# Patient Record
Sex: MALE | Race: WHITE | NOT HISPANIC OR LATINO | URBAN - NONMETROPOLITAN AREA
[De-identification: names, ages, dates, MRNs, and addresses within clinical notes are randomized per-mention and may not be internally consistent; named-entity substitution may affect disease eponyms.]

---

## 2017-01-06 ENCOUNTER — OFFICE VISIT (OUTPATIENT)
Dept: UROLOGY | Facility: CLINIC | Age: 42
End: 2017-01-06

## 2017-01-06 VITALS
SYSTOLIC BLOOD PRESSURE: 124 MMHG | HEIGHT: 69 IN | BODY MASS INDEX: 38.3 KG/M2 | WEIGHT: 258.6 LBS | TEMPERATURE: 97.9 F | DIASTOLIC BLOOD PRESSURE: 68 MMHG

## 2017-01-06 DIAGNOSIS — Z30.09 ENCOUNTER FOR VASECTOMY COUNSELING: Primary | ICD-10-CM

## 2017-01-06 PROCEDURE — 99203 OFFICE O/P NEW LOW 30 MIN: CPT | Performed by: UROLOGY

## 2017-01-06 RX ORDER — DIAZEPAM 10 MG/1
10 TABLET ORAL 2 TIMES DAILY PRN
Qty: 1 TABLET | Refills: 0 | Status: SHIPPED | OUTPATIENT
Start: 2017-01-06 | End: 2017-02-21 | Stop reason: ALTCHOICE

## 2017-01-06 RX ORDER — ATORVASTATIN CALCIUM 40 MG/1
TABLET, FILM COATED ORAL
COMMUNITY

## 2017-01-06 RX ORDER — LISINOPRIL 40 MG/1
TABLET ORAL
COMMUNITY

## 2017-01-06 RX ORDER — CETIRIZINE HYDROCHLORIDE 10 MG/1
TABLET ORAL
COMMUNITY

## 2017-01-06 RX ORDER — HYDROCODONE BITARTRATE AND ACETAMINOPHEN 7.5; 325 MG/1; MG/1
1 TABLET ORAL EVERY 6 HOURS PRN
Qty: 24 TABLET | Refills: 0 | Status: SHIPPED | OUTPATIENT
Start: 2017-01-06 | End: 2017-02-21 | Stop reason: ALTCHOICE

## 2017-01-06 NOTE — PROGRESS NOTES
Subjective    Mr. Gallegos is 41 y.o. male    Chief Complaint: Sterilization    History of Present Illness     Vasectomy Consult  Patient here for pre-vasectomy consultation. He denies hematuria, urinary tract infections, urolithiasis, prostatitis, erectile dysfunction, previous genitourinary surgery, epididymal orchitis, testicular masses, scrotal trauma. He was given the consent form, pre-vasectomy instruction sheet, and vasectomy booklet. I extensively reviewed with him the likely postoperative recuperative period as well as the need to continue to use contraception until he is notified by us of his sterility. He will have a semen analysis after 20-30 ejaculations. He understands the potential side effects of local anesthesia, bleeding, scrotal hematoma, wound infection, epididymal orchitis, epididymal congestion,  1% risk chronic testicular pain potentially requiring further surgery, sperm granuloma, antisperm antibodies, early recanalization, spontaneous recanalization with pregnancy after demonstration of azoospermia risk of 1 in 2000 and the possible association with prostate cancer. He is aware of alternatives to vasectomy. He has given this careful consideration and wishes to proceed with a vasectomy.       The following portions of the patient's history were reviewed and updated as appropriate: allergies, current medications, past family history, past medical history, past social history, past surgical history and problem list.    Review of Systems   Constitutional: Negative for appetite change and fever.   HENT: Negative for hearing loss and sore throat.    Eyes: Negative for pain and redness.   Respiratory: Negative for cough and shortness of breath.    Cardiovascular: Negative for chest pain and leg swelling.   Gastrointestinal: Negative for anal bleeding, nausea and vomiting.   Endocrine: Negative for cold intolerance and heat intolerance.   Genitourinary: Negative for dysuria, flank pain and  "hematuria.   Musculoskeletal: Negative for joint swelling and myalgias.   Skin: Negative for color change and rash.   Allergic/Immunologic: Negative for immunocompromised state.   Neurological: Negative for dizziness and speech difficulty.   Hematological: Negative for adenopathy. Does not bruise/bleed easily.   Psychiatric/Behavioral: Negative for dysphoric mood and suicidal ideas.         Current Outpatient Prescriptions:   •  atorvastatin (LIPITOR) 40 MG tablet, Take 40 mg by mouth daily, Disp: , Rfl:   •  cetirizine (zyrTEC) 10 MG tablet, Take 10 mg by mouth daily, Disp: , Rfl:   •  lisinopril (PRINIVIL,ZESTRIL) 40 MG tablet, Take 40 mg by mouth daily, Disp: , Rfl:   •  diazePAM (VALIUM) 10 MG tablet, Take 1 tablet by mouth 2 (Two) Times a Day As Needed for anxiety. Take prior to procedure, Disp: 1 tablet, Rfl: 0  •  HYDROcodone-acetaminophen (NORCO) 7.5-325 MG per tablet, Take 1 tablet by mouth Every 6 (Six) Hours As Needed for moderate pain (4-6)., Disp: 24 tablet, Rfl: 0    Past Medical History   Diagnosis Date   • Hyperlipidemia    • Hypertension        History reviewed. No pertinent past surgical history.    Social History     Social History   • Marital status:      Spouse name: N/A   • Number of children: N/A   • Years of education: N/A     Social History Main Topics   • Smoking status: Never Smoker   • Smokeless tobacco: None   • Alcohol use No   • Drug use: None   • Sexual activity: Not Asked     Other Topics Concern   • None     Social History Narrative   • None       Family History   Problem Relation Age of Onset   • No Known Problems Father    • No Known Problems Mother        Objective    Visit Vitals   • /68   • Temp 97.9 °F (36.6 °C)   • Ht 69\" (175.3 cm)   • Wt 258 lb 9.6 oz (117 kg)   • BMI 38.19 kg/m2       Physical Exam   Constitutional: He is oriented to person, place, and time. He appears well-developed and well-nourished. No distress.   HENT:   Head: Normocephalic and " atraumatic.   Right Ear: External ear and ear canal normal.   Left Ear: External ear and ear canal normal.   Nose: No nasal deformity. No epistaxis.   Mouth/Throat: Oropharynx is clear and moist. Mucous membranes are not pale, not dry and not cyanotic. Normal dentition. No oropharyngeal exudate.   Neck: Trachea normal. No tracheal tenderness present. No tracheal deviation present. No thyroid mass and no thyromegaly present.   Pulmonary/Chest: Effort normal. No accessory muscle usage. No respiratory distress. Chest wall is not dull to percussion (No flatness or hyperresonance). He exhibits no mass and no tenderness.   On palpation, no tactile fremitus. All movements are symmetric. No intercostal retraction noted.    Abdominal: Soft. Normal appearance. He exhibits no distension and no mass. There is no hepatosplenomegaly. There is no tenderness. No hernia.   Rectal examination or stool specimen is not indicated.    Genitourinary:   Genitourinary Comments: Vas palpably normal     Musculoskeletal:   Normal gait and station. The spine, ribs, and pelvis are examined. No obvious misalignment or asymmetry. ROM is reasonable for age. No instability. No obvious atrophy, flaccidity or spasticity.    Lymphadenopathy:     He has no cervical adenopathy.        Right: No inguinal adenopathy present.        Left: No inguinal adenopathy present.   Neurological: He is alert and oriented to person, place, and time.   Skin: Skin is warm, dry and intact. No lesion and no rash noted. He is not diaphoretic. No cyanosis. No pallor. Nails show no clubbing.   On palpation, there were no induration, subcutaneous nodules, or tightening   Psychiatric: His speech is normal and behavior is normal. Judgment and thought content normal. His mood appears not anxious. His affect is not labile. He does not exhibit a depressed mood.   Vitals reviewed.          No results found for this or any previous visit.  Assessment and Plan    Jaguar was seen  today for sterilization.    Diagnoses and all orders for this visit:    Encounter for vasectomy counseling  -     diazePAM (VALIUM) 10 MG tablet; Take 1 tablet by mouth 2 (Two) Times a Day As Needed for anxiety. Take prior to procedure  -     HYDROcodone-acetaminophen (NORCO) 7.5-325 MG per tablet; Take 1 tablet by mouth Every 6 (Six) Hours As Needed for moderate pain (4-6).  -     Vasectomy; Future

## 2017-01-06 NOTE — MR AVS SNAPSHOT
Jaguar Gallegos   1/6/2017 8:20 AM   Office Visit    Dept Phone:  [phone]   Encounter #:  [id number]    Provider:  Jaguar Mota MD   Department:  [facility] UROLOGY                Your Full Care Plan              Today's Medication Changes          These changes are accurate as of: 1/6/17  9:02 AM.  If you have any questions, ask your nurse or doctor.               New Medication(s)Ordered:     diazePAM 10 MG tablet   Commonly known as:  VALIUM   Take 1 tablet by mouth 2 (Two) Times a Day As Needed for anxiety. Take prior to procedure   Started by:  Jaguar Mota MD       HYDROcodone-acetaminophen 7.5-325 MG per tablet   Commonly known as:  NORCO   Take 1 tablet by mouth Every 6 (Six) Hours As Needed for moderate pain (4-6).   Started by:  Jaguar Mota MD            Where to Get Your Medications      You can get these medications from any pharmacy     Bring a paper prescription for each of these medications     diazePAM 10 MG tablet    HYDROcodone-acetaminophen 7.5-325 MG per tablet                  Your Updated Medication List          This list is accurate as of: 1/6/17  9:02 AM.  Always use your most recent med list.                atorvastatin 40 MG tablet   Commonly known as:  LIPITOR       cetirizine 10 MG tablet   Commonly known as:  zyrTEC       diazePAM 10 MG tablet   Commonly known as:  VALIUM   Take 1 tablet by mouth 2 (Two) Times a Day As Needed for anxiety. Take prior to procedure       HYDROcodone-acetaminophen 7.5-325 MG per tablet   Commonly known as:  NORCO   Take 1 tablet by mouth Every 6 (Six) Hours As Needed for moderate pain (4-6).       lisinopril 40 MG tablet   Commonly known as:  PRINIVIL,ZESTRIL               You Were Diagnosed With        Codes Comments    Encounter for vasectomy counseling    -  Primary ICD-10-CM: Z30.09  ICD-9-CM: V25.09       Instructions     None    Patient Instructions History      Upcoming  "Appointments     Visit Type Date Time Department    NEW PATIENT 2017  8:20 AM [facility]    NEW PATIENT 2017  1:30 PM [facility]    VASECTOMY 3/16/2017  9:30 AM [facility]      MyChart Signup     Our records indicate that you have declined [facility]. If you would like to sign up for MyChart, please email [email or web address] or call [phone] to obtain an activation code.             Other Info from Your Visit           Your Appointments     2017  1:30 PM CST   New Patient with ARNULFO Catherine   [facility] (--)    [address]   [address]  [facility] [address] [phone]           Bring all previous medical records and films, along with current medications and insurance information.            Mar 16, 2017  9:30 AM CDT   VASECTOMY with Jaguar Mota MD   [facility] UROLOGY (--)    [address]  [facility] [address] [phone]              Allergies     Measles, Mumps & Rubella Vac      Tetanus Toxoids      INCLUDES DTP ALLERGY      Reason for Visit     Sterilization           Vital Signs     Blood Pressure Temperature Height Weight Body Mass Index Smoking Status    124/68 97.9 °F (36.6 °C) 69\" (175.3 cm) 258 lb 9.6 oz (117 kg) 38.19 kg/m2 Never Smoker      Problems and Diagnoses Noted     Family planning    -  Primary        "

## 2017-02-21 ENCOUNTER — OFFICE VISIT (OUTPATIENT)
Dept: OTOLARYNGOLOGY | Facility: CLINIC | Age: 42
End: 2017-02-21

## 2017-02-21 VITALS
HEIGHT: 69 IN | SYSTOLIC BLOOD PRESSURE: 154 MMHG | TEMPERATURE: 98.7 F | BODY MASS INDEX: 38.88 KG/M2 | WEIGHT: 262.5 LBS | HEART RATE: 90 BPM | DIASTOLIC BLOOD PRESSURE: 85 MMHG

## 2017-02-21 DIAGNOSIS — R06.83 SNORING: ICD-10-CM

## 2017-02-21 DIAGNOSIS — I10 ESSENTIAL HYPERTENSION: ICD-10-CM

## 2017-02-21 DIAGNOSIS — R53.83 OTHER FATIGUE: ICD-10-CM

## 2017-02-21 DIAGNOSIS — J34.3 HYPERTROPHY OF INFERIOR NASAL TURBINATE: ICD-10-CM

## 2017-02-21 DIAGNOSIS — E66.3 OVERWEIGHT: ICD-10-CM

## 2017-02-21 DIAGNOSIS — G47.33 OSA (OBSTRUCTIVE SLEEP APNEA): ICD-10-CM

## 2017-02-21 DIAGNOSIS — J34.2 DEVIATED NASAL SEPTUM: Primary | ICD-10-CM

## 2017-02-21 PROCEDURE — 99203 OFFICE O/P NEW LOW 30 MIN: CPT | Performed by: NURSE PRACTITIONER

## 2017-02-21 RX ORDER — M-VIT,TX,IRON,MINS/CALC/FOLIC 27MG-0.4MG
TABLET ORAL
COMMUNITY

## 2017-02-21 RX ORDER — FLUTICASONE PROPIONATE 50 MCG
SPRAY, SUSPENSION (ML) NASAL
Refills: 1 | COMMUNITY
Start: 2017-01-03

## 2017-02-21 RX ORDER — FLUTICASONE PROPIONATE 50 MCG
2 SPRAY, SUSPENSION (ML) NASAL DAILY
Qty: 16 G | Refills: 11 | Status: SHIPPED | OUTPATIENT
Start: 2017-02-21 | End: 2017-03-23

## 2017-02-21 RX ORDER — OLOPATADINE HYDROCHLORIDE 665 UG/1
2 SPRAY NASAL 2 TIMES DAILY
Qty: 1 BOTTLE | Refills: 5 | Status: ON HOLD | OUTPATIENT
Start: 2017-02-21 | End: 2019-12-16

## 2017-02-21 NOTE — PROGRESS NOTES
"YOB: 1975  Location: [facility]  Location Address: [address] [place] [address], [facility], [address] [place] [address]  Location Phone: [phone]    Chief Complaint   Patient presents with   • Snoring       History of Present Illness  Jaguar Gallegos is a 42 y.o. male.  Jaguar Gallegos is here for evaluation of ENT complaints. The patient has had problems with snoring  The symptoms are not localized to a particular location. The patient has had severe symptoms. The symptoms have been present for the last several years . The symptoms are aggravated by  no identifiable factors . The symptoms are improved by Bipap.  He has been on BIPAP for several years with decreased toleration of the machine.  He continues to snore and have fatigue.  He is positive for nasal congestion.  He is positive for daytime fatigue.  He is on BP meds.  Neck circumference 17.5\".  [place] scale 5       Past Medical History   Diagnosis Date   • Hyperlipidemia    • Hypertension    • Kidney stones    • Sleep apnea        History reviewed. No pertinent past surgical history.      Current Outpatient Prescriptions:   •  atorvastatin (LIPITOR) 40 MG tablet, Take 40 mg by mouth daily, Disp: , Rfl:   •  cetirizine (zyrTEC) 10 MG tablet, Take 10 mg by mouth daily, Disp: , Rfl:   •  fluticasone (FLONASE) 50 MCG/ACT nasal spray, INSTILL 2 PUFFS INTO EACH NOSTRIL ONCE DAILY, Disp: , Rfl: 1  •  lisinopril (PRINIVIL,ZESTRIL) 40 MG tablet, Take 40 mg by mouth daily, Disp: , Rfl:   •  therapeutic multivitamin-minerals (THERAGRAN-M) tablet, Take 1 tablet by mouth daily, Disp: , Rfl:   •  fluticasone (FLONASE) 50 MCG/ACT nasal spray, 2 sprays into each nostril Daily for 30 days., Disp: 16 g, Rfl: 11  •  olopatadine (PATANASE) 0.6 % solution nasal solution, 2 sprays by Each Nare route 2 (Two) Times a Day., Disp: 1 bottle, Rfl: 5    Measles, mumps & rubella vac and Tetanus toxoids    Family History   Problem Relation Age of Onset   • No Known Problems " Father    • No Known Problems Mother        Social History     Social History   • Marital status:      Spouse name: N/A   • Number of children: N/A   • Years of education: N/A     Occupational History   • Not on file.     Social History Main Topics   • Smoking status: Never Smoker   • Smokeless tobacco: Not on file   • Alcohol use No   • Drug use: Defer   • Sexual activity: Not on file     Other Topics Concern   • Not on file     Social History Narrative       Review of Systems   Constitutional: Negative.    HENT:        SEE HPI   Eyes: Negative.    Respiratory: Negative.    Cardiovascular: Negative.    Gastrointestinal: Negative.    Endocrine: Negative.    Genitourinary: Negative.    Musculoskeletal: Negative.    Skin: Negative.    Allergic/Immunologic: Negative.    Neurological: Negative.    Hematological: Negative.    Psychiatric/Behavioral: Negative.        Vitals:    02/21/17 1329   BP: 154/85   Pulse: 90   Temp: 98.7 °F (37.1 °C)       Objective     Physical Exam  CONSTITUTIONAL: well nourished, overweight, alert, oriented, in no acute distress     COMMUNICATION AND VOICE: able to communicate normally, normal voice quality    HEAD: normocephalic, no lesions, atraumatic, no tenderness, no masses     FACE: appearance normal, no lesions, no tenderness, no deformities, facial motion symmetric    SALIVARY GLANDS: parotid glands with no tenderness, no swelling, no masses, submandibular glands with normal size, nontender    EYES: ocular motility normal, eyelids normal, orbits normal, no proptosis, conjunctiva normal , pupils equal, round     EARS:  Hearing: response to conversational voice normal bilaterally   External Ears: auricles without lesions  Otoscopic: tympanic membrane appearance normal, no lesions, no perforation, normal mobility, no fluid    NOSE:  External Nose: structure normal, no tenderness on palpation, no nasal discharge, no lesions, no evidence of trauma, nostrils patent   Intranasal Exam:  right septal deviation partially obstructive with hypertrophy of inferior turbinates.      ORAL:  Lips: upper and lower lips without lesion   Teeth: dentition within normal limits for age   Gums: gingivae healthy   Oral Mucosa: oral mucosa normal, no mucosal lesions   Floor of Mouth: Warthin’s duct patent, mucosa normal  Tongue: lingual mucosa normal without lesions, normal tongue mobility   Palate: soft and hard palates with normal mucosa and structure  Oropharynx: low palate with enlarged tongue, low positioned uvula with Malampati scale 4    NECK: neck appearance normal, no mass,  noted without erythema or tenderness    THYROID: no overt thyromegaly, no tenderness, nodules or mass present on palpation, position midline     LYMPH NODES: no lymphadenopathy    CHEST/RESPIRATORY: respiratory effort normal    CARDIOVASCULAR: extremities without cyanosis or edema      NEUROLOGIC/PSYCHIATRIC: oriented to time, place and person, mood normal, affect appropriate, CN II-XII intact grossly    Assessment/Plan   Jaguar was seen today for snoring.    Diagnoses and all orders for this visit:    Deviated nasal septum  -     CT Sinus Without Contrast; Future    Snoring  -     CT Sinus Without Contrast; Future    Overweight    ABHISHEK (obstructive sleep apnea)    Hypertrophy of inferior nasal turbinate    Other fatigue    Essential hypertension    Other orders  -     fluticasone (FLONASE) 50 MCG/ACT nasal spray; 2 sprays into each nostril Daily for 30 days.  -     olopatadine (PATANASE) 0.6 % solution nasal solution; 2 sprays by Each Nare route 2 (Two) Times a Day.        Orders Placed This Encounter   Procedures   • CT Sinus Without Contrast     Standing Status:   Future     Standing Expiration Date:   2/21/2018     Order Specific Question:   Reason for Exam:     Answer:   septal deviation, snoring     Return in about 6 weeks (around 4/4/2017).       Patient Instructions   Obtain sleep study.  Add nasal sprays.  Followup with CT  SINUSES.  Call for problems or worsening symptoms    Medical vs surgical options discussed

## 2017-02-21 NOTE — PATIENT INSTRUCTIONS
Obtain sleep study.  Add nasal sprays.  Followup with CT SINUSES.  Call for problems or worsening symptoms    Medical vs surgical options discussed

## 2017-03-15 ENCOUNTER — TELEPHONE (OUTPATIENT)
Dept: UROLOGY | Facility: CLINIC | Age: 42
End: 2017-03-15

## 2017-03-16 ENCOUNTER — OFFICE VISIT (OUTPATIENT)
Dept: UROLOGY | Facility: CLINIC | Age: 42
End: 2017-03-16

## 2017-03-16 DIAGNOSIS — Z30.2 ENCOUNTER FOR VASECTOMY: Primary | ICD-10-CM

## 2017-03-16 PROCEDURE — 55250 REMOVAL OF SPERM DUCT(S): CPT | Performed by: UROLOGY

## 2017-03-16 NOTE — PROGRESS NOTES
No Scalpel Vasectomy Procedure Note    Indications: 42 y.o. male desiring permanent sterilization    Pre-operative Diagnosis: Undesired fertility    Post-operative Diagnosis: Undesired fertility    Anesthesia: Lidocaine 1% without epinephrine without added sodium bicarbonate    Procedure Details     The risks and benefits of the procedure were discussed at the pre-procedure consultation, and written, informed consent obtained.    Premedicated with Norco 7.5 and Valium 10 mg 30 minutes prior to procedure.    The scrotum was palpated with both testes normal in size and position, no masses palpated. The scrotum was cleansed with warm Betadine and draped in the usual sterile manner.     A vasal sheath block was performed on both the left and right vas.  After adequate anesthesia was established, a small perforation was made in the skin and the right vas was isolated with the ring forceps, dissected free and delivered through the skin perforation.  The right vas was divided, approximately 3 cm portion removed, and each end of the vas was cauterized.  The ends of the vas were replaced in the scrotum through the puncture site.  The left vas was then isolated, divided, cauterized in a similar fashion.  Midportions removed not sent to pathology to confirm because they were grossly normal.     Any bleeding was controlled with electrocautery.  3.0 Chormic interrupted suture was used to close both sites. The puncture site was dry when the procedure was completed. Dressing was applied to both incisions and jock strap placed for scrotal support.    Specimen: None    Condition: Stable    Complications: None    Plan:  1. Continue contraception until negative sperm analysis. Bring 2 semen samples after 20-30 ejaculates  2. Warning signs of infection were reviewed.   3. Patient is taken home by significant other with written home care instructions.  Bedrest X 48 hrs, Ice pack every 3 hours for 24 hrs.    Call the clinic if excessive  pain, bleeding or swelling.

## 2017-04-10 ENCOUNTER — HOSPITAL ENCOUNTER (INPATIENT)
Facility: HOSPITAL | Age: 42
LOS: 2 days | Discharge: HOME | End: 2017-04-12
Attending: INTERNAL MEDICINE | Admitting: INTERNAL MEDICINE

## 2017-04-10 DIAGNOSIS — T63.301A: ICD-10-CM

## 2017-04-10 PROBLEM — L02.419 ABSCESS OF THIGH: Status: ACTIVE | Noted: 2017-04-10

## 2017-04-10 PROBLEM — H92.09 EAR ACHE: Status: ACTIVE | Noted: 2017-04-10

## 2017-04-10 PROBLEM — E78.5 HYPERLIPIDEMIA: Status: ACTIVE | Noted: 2017-04-10

## 2017-04-10 PROBLEM — L02.419 THIGH ABSCESS: Status: ACTIVE | Noted: 2017-04-10

## 2017-04-10 PROBLEM — I10 ESSENTIAL HYPERTENSION: Status: ACTIVE | Noted: 2017-04-10

## 2017-04-10 LAB
ALBUMIN SERPL-MCNC: 4.2 G/DL (ref 3.5–5)
ALBUMIN/GLOB SERPL: 1.4 G/DL (ref 1.1–2.5)
ALP SERPL-CCNC: 112 U/L (ref 24–120)
ALT SERPL W P-5'-P-CCNC: 35 U/L (ref 0–54)
ANION GAP SERPL CALCULATED.3IONS-SCNC: 17 MMOL/L (ref 4–13)
AST SERPL-CCNC: 29 U/L (ref 7–45)
BASOPHILS # BLD AUTO: 0.02 10*3/MM3 (ref 0–0.2)
BASOPHILS NFR BLD AUTO: 0.2 % (ref 0–2)
BILIRUB SERPL-MCNC: 0.6 MG/DL (ref 0.1–1)
BUN BLD-MCNC: 14 MG/DL (ref 5–21)
BUN/CREAT SERPL: 14 (ref 7–25)
CALCIUM SPEC-SCNC: 9.1 MG/DL (ref 8.4–10.4)
CHLORIDE SERPL-SCNC: 102 MMOL/L (ref 98–110)
CK SERPL-CCNC: 131 U/L (ref 0–203)
CO2 SERPL-SCNC: 25 MMOL/L (ref 24–31)
CREAT BLD-MCNC: 1 MG/DL (ref 0.5–1.4)
DEPRECATED RDW RBC AUTO: 42.6 FL (ref 40–54)
EOSINOPHIL # BLD AUTO: 0.2 10*3/MM3 (ref 0–0.7)
EOSINOPHIL NFR BLD AUTO: 1.7 % (ref 0–4)
ERYTHROCYTE [DISTWIDTH] IN BLOOD BY AUTOMATED COUNT: 14.2 % (ref 12–15)
GFR SERPL CREATININE-BSD FRML MDRD: 82 ML/MIN/1.73
GLOBULIN UR ELPH-MCNC: 3.1 GM/DL
GLUCOSE BLD-MCNC: 96 MG/DL (ref 70–100)
HCT VFR BLD AUTO: 37.2 % (ref 40–52)
HGB BLD-MCNC: 12.3 G/DL (ref 14–18)
IMM GRANULOCYTES # BLD: 0.02 10*3/MM3 (ref 0–0.03)
IMM GRANULOCYTES NFR BLD: 0.2 % (ref 0–5)
LYMPHOCYTES # BLD AUTO: 1.44 10*3/MM3 (ref 0.72–4.86)
LYMPHOCYTES NFR BLD AUTO: 12 % (ref 15–45)
MCH RBC QN AUTO: 27 PG (ref 28–32)
MCHC RBC AUTO-ENTMCNC: 33.1 G/DL (ref 33–36)
MCV RBC AUTO: 81.6 FL (ref 82–95)
MONOCYTES # BLD AUTO: 0.77 10*3/MM3 (ref 0.19–1.3)
MONOCYTES NFR BLD AUTO: 6.4 % (ref 4–12)
NEUTROPHILS # BLD AUTO: 9.57 10*3/MM3 (ref 1.87–8.4)
NEUTROPHILS NFR BLD AUTO: 79.5 % (ref 39–78)
PLATELET # BLD AUTO: 298 10*3/MM3 (ref 130–400)
PMV BLD AUTO: 9.6 FL (ref 6–12)
POTASSIUM BLD-SCNC: 4 MMOL/L (ref 3.5–5.3)
PROT SERPL-MCNC: 7.3 G/DL (ref 6.3–8.7)
RBC # BLD AUTO: 4.56 10*6/MM3 (ref 4.8–5.9)
SODIUM BLD-SCNC: 144 MMOL/L (ref 135–145)
WBC NRBC COR # BLD: 12.02 10*3/MM3 (ref 4.8–10.8)

## 2017-04-10 PROCEDURE — 87070 CULTURE OTHR SPECIMN AEROBIC: CPT | Performed by: INTERNAL MEDICINE

## 2017-04-10 PROCEDURE — 25010000002 ENOXAPARIN PER 10 MG: Performed by: INTERNAL MEDICINE

## 2017-04-10 PROCEDURE — 25010000002 MORPHINE SULFATE (PF) 2 MG/ML SOLUTION: Performed by: INTERNAL MEDICINE

## 2017-04-10 PROCEDURE — 87147 CULTURE TYPE IMMUNOLOGIC: CPT | Performed by: INTERNAL MEDICINE

## 2017-04-10 PROCEDURE — 25010000002 KETOROLAC TROMETHAMINE PER 15 MG: Performed by: INTERNAL MEDICINE

## 2017-04-10 PROCEDURE — 87186 SC STD MICRODIL/AGAR DIL: CPT | Performed by: INTERNAL MEDICINE

## 2017-04-10 PROCEDURE — 87205 SMEAR GRAM STAIN: CPT | Performed by: INTERNAL MEDICINE

## 2017-04-10 PROCEDURE — 80053 COMPREHEN METABOLIC PANEL: CPT | Performed by: INTERNAL MEDICINE

## 2017-04-10 PROCEDURE — 25010000002 VANCOMYCIN PER 500 MG: Performed by: INTERNAL MEDICINE

## 2017-04-10 PROCEDURE — 82550 ASSAY OF CK (CPK): CPT | Performed by: INTERNAL MEDICINE

## 2017-04-10 PROCEDURE — 87185 SC STD ENZYME DETCJ PER NZM: CPT | Performed by: INTERNAL MEDICINE

## 2017-04-10 PROCEDURE — 85025 COMPLETE CBC W/AUTO DIFF WBC: CPT | Performed by: INTERNAL MEDICINE

## 2017-04-10 PROCEDURE — 87040 BLOOD CULTURE FOR BACTERIA: CPT | Performed by: INTERNAL MEDICINE

## 2017-04-10 RX ORDER — NALOXONE HCL 0.4 MG/ML
0.4 VIAL (ML) INJECTION
Status: DISCONTINUED | OUTPATIENT
Start: 2017-04-10 | End: 2017-04-12 | Stop reason: HOSPADM

## 2017-04-10 RX ORDER — ATORVASTATIN CALCIUM 40 MG/1
40 TABLET, FILM COATED ORAL NIGHTLY
Status: DISCONTINUED | OUTPATIENT
Start: 2017-04-10 | End: 2017-04-12 | Stop reason: HOSPADM

## 2017-04-10 RX ORDER — VANCOMYCIN HYDROCHLORIDE 1 G/200ML
1000 INJECTION, SOLUTION INTRAVENOUS EVERY 8 HOURS
Status: DISCONTINUED | OUTPATIENT
Start: 2017-04-10 | End: 2017-04-12 | Stop reason: DRUGHIGH

## 2017-04-10 RX ORDER — ONDANSETRON 2 MG/ML
4 INJECTION INTRAMUSCULAR; INTRAVENOUS EVERY 6 HOURS PRN
Status: DISCONTINUED | OUTPATIENT
Start: 2017-04-10 | End: 2017-04-12 | Stop reason: HOSPADM

## 2017-04-10 RX ORDER — MORPHINE SULFATE 2 MG/ML
1 INJECTION, SOLUTION INTRAMUSCULAR; INTRAVENOUS EVERY 4 HOURS PRN
Status: DISCONTINUED | OUTPATIENT
Start: 2017-04-10 | End: 2017-04-12 | Stop reason: HOSPADM

## 2017-04-10 RX ORDER — LISINOPRIL 20 MG/1
40 TABLET ORAL
Status: DISCONTINUED | OUTPATIENT
Start: 2017-04-10 | End: 2017-04-12 | Stop reason: HOSPADM

## 2017-04-10 RX ORDER — KETOROLAC TROMETHAMINE 30 MG/ML
30 INJECTION, SOLUTION INTRAMUSCULAR; INTRAVENOUS EVERY 6 HOURS PRN
Status: DISPENSED | OUTPATIENT
Start: 2017-04-10 | End: 2017-04-11

## 2017-04-10 RX ORDER — SODIUM CHLORIDE 0.9 % (FLUSH) 0.9 %
1-10 SYRINGE (ML) INJECTION AS NEEDED
Status: DISCONTINUED | OUTPATIENT
Start: 2017-04-10 | End: 2017-04-12 | Stop reason: HOSPADM

## 2017-04-10 RX ORDER — CETIRIZINE HYDROCHLORIDE 10 MG/1
10 TABLET ORAL DAILY
Status: DISCONTINUED | OUTPATIENT
Start: 2017-04-10 | End: 2017-04-12 | Stop reason: HOSPADM

## 2017-04-10 RX ORDER — SODIUM CHLORIDE 9 MG/ML
100 INJECTION, SOLUTION INTRAVENOUS CONTINUOUS
Status: DISCONTINUED | OUTPATIENT
Start: 2017-04-10 | End: 2017-04-12 | Stop reason: HOSPADM

## 2017-04-10 RX ADMIN — MORPHINE SULFATE 1 MG: 2 INJECTION, SOLUTION INTRAMUSCULAR; INTRAVENOUS at 22:06

## 2017-04-10 RX ADMIN — VANCOMYCIN HYDROCHLORIDE 1000 MG: 1 INJECTION, SOLUTION INTRAVENOUS at 22:16

## 2017-04-10 RX ADMIN — KETOROLAC TROMETHAMINE 30 MG: 30 INJECTION, SOLUTION INTRAMUSCULAR at 22:06

## 2017-04-10 RX ADMIN — COLLAGENASE SANTYL: 250 OINTMENT TOPICAL at 22:06

## 2017-04-10 RX ADMIN — SODIUM CHLORIDE 100 ML/HR: 9 INJECTION, SOLUTION INTRAVENOUS at 22:08

## 2017-04-10 RX ADMIN — ENOXAPARIN SODIUM 40 MG: 40 INJECTION SUBCUTANEOUS at 20:17

## 2017-04-10 NOTE — H&P
"    [facility]  HISTORY AND PHYSICAL    Date of Admission: 4/10/2017  Primary Care Physician: John Recio MD    Subjective     Chief Complaint: sent as direct admit by [facility] - felt will require IV abxs for soft tissue infection    History of Present Illness  Past Saturday -  Ear hurting  Right side - went to clinic  --- TMJ  disease  Last week still continue to hurt;   The prior Thursday, went to see a dentist for cleaning and X-ray - everything was normal  This past Thursday, he went to [facility] -- \"ear canal and drum infection\"; he also showed \"spots\" in his legs/lower extremities  She prescribed Cipro and \"eardrop\"  With regards the spot, it was assumed that Cipro would cover the ear and \"spots\"  The spot spread and been burning.  Questionable spider bite on right leg  Friction rub from keys in his pockets climbing ladder (part of job - )      Review of Systems   Felt feverish, chills  No nausea, vomiting  + aching lower extremities  no  Urinary or bowel disturbance  Uncomfortable in sitting  Otherwise complete ROS reviewed and negative except as mentioned in the HPI.      Past Medical History:   Past Medical History:   Diagnosis Date   • Deviated nasal septum    • Hyperlipidemia    • Hypertension    • Hypertrophy of inferior nasal turbinate    • Kidney stones    • Obstructive sleep apnea    • Sleep apnea    • Snoring        Past Surgical History: Vasectomy - March 16, 2017    Social History:  reports that he has never smoked. He does not have any smokeless tobacco history on file. Drug use questions deferred to the physician. He reports that he does not drink alcohol.     Family History: Hypertension, hyperlipidemia and    Heart disease    Allergies:  Allergies   Allergen Reactions   • Measles, Mumps & Rubella Vac    • Tetanus Toxoids      INCLUDES DTP ALLERGY  INCLUDES DTP ALLERGY       Medications:  Prior to " Admission medications    Medication Sig Start Date End Date Taking? Authorizing Provider   atorvastatin (LIPITOR) 40 MG tablet Take 40 mg by mouth daily    Historical Provider, MD   cetirizine (zyrTEC) 10 MG tablet Take 10 mg by mouth daily    Historical Provider, MD   fluticasone (FLONASE) 50 MCG/ACT nasal spray INSTILL 2 PUFFS INTO EACH NOSTRIL ONCE DAILY 1/3/17   Historical Provider, MD   lisinopril (PRINIVIL,ZESTRIL) 40 MG tablet Take 40 mg by mouth daily    Historical Provider, MD   olopatadine (PATANASE) 0.6 % solution nasal solution 2 sprays by Each Nare route 2 (Two) Times a Day. 2/21/17   ARNULFO Catherine   therapeutic multivitamin-minerals (THERAGRAN-M) tablet Take 1 tablet by mouth daily    Historical Provider, MD       Objective     Vital Signs: There were no vitals taken for this visit.  Physical Exam   Constitutional: He is oriented to person, place, and time. He appears well-developed and well-nourished.   HENT:   Head: Normocephalic and atraumatic.   Right Ear: External ear normal.   Left Ear: External ear normal.   Nose: Nose normal.   Mouth/Throat: Oropharynx is clear and moist. No oropharyngeal exudate.   Eyes: Conjunctivae and EOM are normal. Pupils are equal, round, and reactive to light. No scleral icterus.   Neck: Normal range of motion. Neck supple. No JVD present. No tracheal deviation present. No thyromegaly present.   Cardiovascular: Normal rate, regular rhythm and normal heart sounds.    Pulmonary/Chest: Effort normal and breath sounds normal.   Abdominal: Soft. Bowel sounds are normal. He exhibits no distension. There is no tenderness.   Musculoskeletal: Normal range of motion. He exhibits edema.   Lymphadenopathy:     He has no cervical adenopathy.   Neurological: He is alert and oriented to person, place, and time.   Skin: Skin is warm and dry. No erythema.        Psychiatric: He has a normal mood and affect. His behavior is normal. Judgment and thought content normal.            Results Reviewed:  Lab Results (last 24 hours)     ** No results found for the last 24 hours. **        Imaging Results (last 24 hours)     ** No results found for the last 24 hours. **          I have personally reviewed and interpreted the radiology studies and ECG obtained at time of admission.     Assessment / Plan     Assessment & Plan    Abscesses bilateral thigh and left leg  Limb edema sec. To above  Pain sec. To above  Right ear otalgia  Hypertension  Hyperlipidemia  Sleep apnea - cpap      Vancomycin; pharmacy to dose  Wound and blood culture  Resume home medications except cipro  Other plans per orders  Warm compress  Collagenase on right leg wound  Labs  Discussed with Rosita      Code Status: full     I discussed the patients findings and my recommendations with  patinet and wife  Estimated length of stay 3-4 days    Raffaele Obrien MD   04/10/17   5:23 PM

## 2017-04-11 ENCOUNTER — ANESTHESIA EVENT (OUTPATIENT)
Dept: PERIOP | Facility: HOSPITAL | Age: 42
End: 2017-04-11

## 2017-04-11 ENCOUNTER — ANESTHESIA (OUTPATIENT)
Dept: PERIOP | Facility: HOSPITAL | Age: 42
End: 2017-04-11

## 2017-04-11 PROCEDURE — 87206 SMEAR FLUORESCENT/ACID STAI: CPT | Performed by: SPECIALIST

## 2017-04-11 PROCEDURE — 25010000002 ONDANSETRON PER 1 MG: Performed by: NURSE ANESTHETIST, CERTIFIED REGISTERED

## 2017-04-11 PROCEDURE — 25010000002 KETOROLAC TROMETHAMINE PER 15 MG: Performed by: INTERNAL MEDICINE

## 2017-04-11 PROCEDURE — 87185 SC STD ENZYME DETCJ PER NZM: CPT | Performed by: INTERNAL MEDICINE

## 2017-04-11 PROCEDURE — 25010000002 MIDAZOLAM PER 1 MG: Performed by: ANESTHESIOLOGY

## 2017-04-11 PROCEDURE — 25010000002 VANCOMYCIN PER 500 MG: Performed by: INTERNAL MEDICINE

## 2017-04-11 PROCEDURE — 87205 SMEAR GRAM STAIN: CPT | Performed by: INTERNAL MEDICINE

## 2017-04-11 PROCEDURE — 87186 SC STD MICRODIL/AGAR DIL: CPT | Performed by: INTERNAL MEDICINE

## 2017-04-11 PROCEDURE — 25010000002 DEXAMETHASONE PER 1 MG: Performed by: NURSE ANESTHETIST, CERTIFIED REGISTERED

## 2017-04-11 PROCEDURE — 0J9N0ZZ DRAINAGE OF RIGHT LOWER LEG SUBCUTANEOUS TISSUE AND FASCIA, OPEN APPROACH: ICD-10-PCS | Performed by: SPECIALIST

## 2017-04-11 PROCEDURE — 25010000002 FENTANYL CITRATE (PF) 250 MCG/5ML SOLUTION: Performed by: NURSE ANESTHETIST, CERTIFIED REGISTERED

## 2017-04-11 PROCEDURE — 25010000002 METOCLOPRAMIDE PER 10 MG: Performed by: ANESTHESIOLOGY

## 2017-04-11 PROCEDURE — 87116 MYCOBACTERIA CULTURE: CPT | Performed by: SPECIALIST

## 2017-04-11 PROCEDURE — 87075 CULTR BACTERIA EXCEPT BLOOD: CPT | Performed by: SPECIALIST

## 2017-04-11 PROCEDURE — 87147 CULTURE TYPE IMMUNOLOGIC: CPT | Performed by: INTERNAL MEDICINE

## 2017-04-11 PROCEDURE — 87070 CULTURE OTHR SPECIMN AEROBIC: CPT | Performed by: INTERNAL MEDICINE

## 2017-04-11 PROCEDURE — 25010000002 MORPHINE SULFATE (PF) 2 MG/ML SOLUTION: Performed by: INTERNAL MEDICINE

## 2017-04-11 PROCEDURE — 87176 TISSUE HOMOGENIZATION CULTR: CPT | Performed by: INTERNAL MEDICINE

## 2017-04-11 PROCEDURE — 25010000002 PROPOFOL 10 MG/ML EMULSION: Performed by: NURSE ANESTHETIST, CERTIFIED REGISTERED

## 2017-04-11 RX ORDER — SODIUM CHLORIDE 0.9 % (FLUSH) 0.9 %
1-10 SYRINGE (ML) INJECTION AS NEEDED
Status: DISCONTINUED | OUTPATIENT
Start: 2017-04-11 | End: 2017-04-11 | Stop reason: HOSPADM

## 2017-04-11 RX ORDER — IPRATROPIUM BROMIDE AND ALBUTEROL SULFATE 2.5; .5 MG/3ML; MG/3ML
3 SOLUTION RESPIRATORY (INHALATION) ONCE AS NEEDED
Status: DISCONTINUED | OUTPATIENT
Start: 2017-04-11 | End: 2017-04-11 | Stop reason: HOSPADM

## 2017-04-11 RX ORDER — METOCLOPRAMIDE HYDROCHLORIDE 5 MG/ML
10 INJECTION INTRAMUSCULAR; INTRAVENOUS ONCE AS NEEDED
Status: COMPLETED | OUTPATIENT
Start: 2017-04-11 | End: 2017-04-11

## 2017-04-11 RX ORDER — ROCURONIUM BROMIDE 10 MG/ML
INJECTION, SOLUTION INTRAVENOUS AS NEEDED
Status: DISCONTINUED | OUTPATIENT
Start: 2017-04-11 | End: 2017-04-11 | Stop reason: SURG

## 2017-04-11 RX ORDER — ONDANSETRON 2 MG/ML
INJECTION INTRAMUSCULAR; INTRAVENOUS AS NEEDED
Status: DISCONTINUED | OUTPATIENT
Start: 2017-04-11 | End: 2017-04-11 | Stop reason: SURG

## 2017-04-11 RX ORDER — MEPERIDINE HYDROCHLORIDE 25 MG/ML
12.5 INJECTION INTRAMUSCULAR; INTRAVENOUS; SUBCUTANEOUS
Status: DISCONTINUED | OUTPATIENT
Start: 2017-04-11 | End: 2017-04-11 | Stop reason: HOSPADM

## 2017-04-11 RX ORDER — LABETALOL HYDROCHLORIDE 5 MG/ML
5 INJECTION, SOLUTION INTRAVENOUS
Status: DISCONTINUED | OUTPATIENT
Start: 2017-04-11 | End: 2017-04-11 | Stop reason: HOSPADM

## 2017-04-11 RX ORDER — ONDANSETRON 2 MG/ML
4 INJECTION INTRAMUSCULAR; INTRAVENOUS AS NEEDED
Status: DISCONTINUED | OUTPATIENT
Start: 2017-04-11 | End: 2017-04-11 | Stop reason: HOSPADM

## 2017-04-11 RX ORDER — FLUMAZENIL 0.1 MG/ML
0.2 INJECTION INTRAVENOUS AS NEEDED
Status: DISCONTINUED | OUTPATIENT
Start: 2017-04-11 | End: 2017-04-11 | Stop reason: HOSPADM

## 2017-04-11 RX ORDER — PROPOFOL 10 MG/ML
VIAL (ML) INTRAVENOUS AS NEEDED
Status: DISCONTINUED | OUTPATIENT
Start: 2017-04-11 | End: 2017-04-11 | Stop reason: SURG

## 2017-04-11 RX ORDER — MIDAZOLAM HYDROCHLORIDE 1 MG/ML
2 INJECTION INTRAMUSCULAR; INTRAVENOUS
Status: DISCONTINUED | OUTPATIENT
Start: 2017-04-11 | End: 2017-04-11 | Stop reason: HOSPADM

## 2017-04-11 RX ORDER — DEXAMETHASONE SODIUM PHOSPHATE 4 MG/ML
INJECTION, SOLUTION INTRA-ARTICULAR; INTRALESIONAL; INTRAMUSCULAR; INTRAVENOUS; SOFT TISSUE AS NEEDED
Status: DISCONTINUED | OUTPATIENT
Start: 2017-04-11 | End: 2017-04-11 | Stop reason: SURG

## 2017-04-11 RX ORDER — MORPHINE SULFATE 2 MG/ML
2 INJECTION, SOLUTION INTRAMUSCULAR; INTRAVENOUS AS NEEDED
Status: DISCONTINUED | OUTPATIENT
Start: 2017-04-11 | End: 2017-04-11 | Stop reason: HOSPADM

## 2017-04-11 RX ORDER — NALOXONE HCL 0.4 MG/ML
0.04 VIAL (ML) INJECTION AS NEEDED
Status: DISCONTINUED | OUTPATIENT
Start: 2017-04-11 | End: 2017-04-11 | Stop reason: HOSPADM

## 2017-04-11 RX ORDER — MIDAZOLAM HYDROCHLORIDE 1 MG/ML
1 INJECTION INTRAMUSCULAR; INTRAVENOUS
Status: DISCONTINUED | OUTPATIENT
Start: 2017-04-11 | End: 2017-04-11 | Stop reason: HOSPADM

## 2017-04-11 RX ORDER — SODIUM CHLORIDE, SODIUM LACTATE, POTASSIUM CHLORIDE, CALCIUM CHLORIDE 600; 310; 30; 20 MG/100ML; MG/100ML; MG/100ML; MG/100ML
100 INJECTION, SOLUTION INTRAVENOUS CONTINUOUS
Status: DISCONTINUED | OUTPATIENT
Start: 2017-04-11 | End: 2017-04-11

## 2017-04-11 RX ORDER — HYDRALAZINE HYDROCHLORIDE 20 MG/ML
5 INJECTION INTRAMUSCULAR; INTRAVENOUS
Status: DISCONTINUED | OUTPATIENT
Start: 2017-04-11 | End: 2017-04-11 | Stop reason: HOSPADM

## 2017-04-11 RX ORDER — MAGNESIUM HYDROXIDE 1200 MG/15ML
LIQUID ORAL AS NEEDED
Status: DISCONTINUED | OUTPATIENT
Start: 2017-04-11 | End: 2017-04-11 | Stop reason: HOSPADM

## 2017-04-11 RX ORDER — FENTANYL CITRATE 50 UG/ML
INJECTION, SOLUTION INTRAMUSCULAR; INTRAVENOUS AS NEEDED
Status: DISCONTINUED | OUTPATIENT
Start: 2017-04-11 | End: 2017-04-11 | Stop reason: SURG

## 2017-04-11 RX ORDER — METOCLOPRAMIDE HYDROCHLORIDE 5 MG/ML
5 INJECTION INTRAMUSCULAR; INTRAVENOUS
Status: DISCONTINUED | OUTPATIENT
Start: 2017-04-11 | End: 2017-04-11 | Stop reason: HOSPADM

## 2017-04-11 RX ADMIN — MIDAZOLAM HYDROCHLORIDE 2 MG: 1 INJECTION, SOLUTION INTRAMUSCULAR; INTRAVENOUS at 09:53

## 2017-04-11 RX ADMIN — FENTANYL CITRATE 50 MCG: 50 INJECTION INTRAMUSCULAR; INTRAVENOUS at 10:25

## 2017-04-11 RX ADMIN — DESMOPRESSIN ACETATE 40 MG: 0.2 TABLET ORAL at 21:12

## 2017-04-11 RX ADMIN — VANCOMYCIN HYDROCHLORIDE 1000 MG: 1 INJECTION, SOLUTION INTRAVENOUS at 05:10

## 2017-04-11 RX ADMIN — ONDANSETRON HYDROCHLORIDE 4 MG: 2 SOLUTION INTRAMUSCULAR; INTRAVENOUS at 10:15

## 2017-04-11 RX ADMIN — LIDOCAINE HYDROCHLORIDE 0.5 ML: 10 INJECTION, SOLUTION EPIDURAL; INFILTRATION; INTRACAUDAL; PERINEURAL at 09:51

## 2017-04-11 RX ADMIN — PROPOFOL 150 MG: 10 INJECTION, EMULSION INTRAVENOUS at 10:03

## 2017-04-11 RX ADMIN — METOCLOPRAMIDE 10 MG: 5 INJECTION, SOLUTION INTRAMUSCULAR; INTRAVENOUS at 09:53

## 2017-04-11 RX ADMIN — SODIUM CHLORIDE, POTASSIUM CHLORIDE, SODIUM LACTATE AND CALCIUM CHLORIDE 100 ML/HR: 600; 310; 30; 20 INJECTION, SOLUTION INTRAVENOUS at 09:51

## 2017-04-11 RX ADMIN — VANCOMYCIN HYDROCHLORIDE 1000 MG: 1 INJECTION, SOLUTION INTRAVENOUS at 21:05

## 2017-04-11 RX ADMIN — SODIUM CHLORIDE 100 ML/HR: 9 INJECTION, SOLUTION INTRAVENOUS at 21:12

## 2017-04-11 RX ADMIN — KETOROLAC TROMETHAMINE 30 MG: 30 INJECTION, SOLUTION INTRAMUSCULAR at 13:16

## 2017-04-11 RX ADMIN — VANCOMYCIN HYDROCHLORIDE 1000 MG: 1 INJECTION, SOLUTION INTRAVENOUS at 14:05

## 2017-04-11 RX ADMIN — FENTANYL CITRATE 150 MCG: 50 INJECTION INTRAMUSCULAR; INTRAVENOUS at 10:01

## 2017-04-11 RX ADMIN — ROCURONIUM BROMIDE 20 MG: 10 INJECTION INTRAVENOUS at 10:03

## 2017-04-11 RX ADMIN — MORPHINE SULFATE 1 MG: 2 INJECTION, SOLUTION INTRAMUSCULAR; INTRAVENOUS at 05:10

## 2017-04-11 RX ADMIN — FENTANYL CITRATE 50 MCG: 50 INJECTION INTRAMUSCULAR; INTRAVENOUS at 10:17

## 2017-04-11 RX ADMIN — LISINOPRIL 40 MG: 20 TABLET ORAL at 08:38

## 2017-04-11 RX ADMIN — DEXAMETHASONE SODIUM PHOSPHATE 4 MG: 4 INJECTION, SOLUTION INTRAMUSCULAR; INTRAVENOUS at 10:02

## 2017-04-11 NOTE — PROGRESS NOTES
"Pharmacokinetic Initial Note - Vancomycin    Jaguar Gallegos is a 42 y.o. male   [Ht: 69\" (175.3 cm); Wt: 256 lb 6.4 oz (116 kg)]    Estimated Creatinine Clearance: 120.9 mL/min (by C-G formula based on Cr of 1).   Lab Results   Component Value Date    CREATININE 1.00 04/10/2017      Lab Results   Component Value Date    WBC 12.02 (H) 04/10/2017      Baseline culture results:  Microbiology Results (last 10 days)       ** No results found for the last 240 hours. **            Indication for use: Skin and Soft Tissue Infection     Assessment/Plan  Initiated Vancomycin 1000 mg IVPB Q8H. Will order Vancomycin trough level prior to 4th dose.  Pharmacy will monitor renal function and adjust dose accordingly.    Kiera Hernández, [facility]  04/10/17 8:32 PM     "

## 2017-04-11 NOTE — PLAN OF CARE
Problem: Patient Care Overview (Adult)  Goal: Plan of Care Review  Outcome: Ongoing (interventions implemented as appropriate)  I and D performed today on all 3 areas.  C/O minimum pain.  Continue antibiotics and pain meds as ordered     04/11/17 0507   Coping/Psychosocial Response Interventions   Plan Of Care Reviewed With patient   Patient Care Overview   Progress no change         Problem: Cellulitis (Adult)  Goal: Signs and Symptoms of Listed Potential Problems Will be Absent or Manageable (Cellulitis)  Outcome: Ongoing (interventions implemented as appropriate)    Problem: Perioperative Period (Adult)  Goal: Signs and Symptoms of Listed Potential Problems Will be Absent or Manageable (Perioperative Period)  Outcome: Ongoing (interventions implemented as appropriate)

## 2017-04-11 NOTE — PLAN OF CARE
Problem: Patient Care Overview (Adult)  Goal: Plan of Care Review  Outcome: Ongoing (interventions implemented as appropriate)    04/11/17 0321   Coping/Psychosocial Response Interventions   Plan Of Care Reviewed With patient;spouse   Patient Care Overview   Progress no change   Outcome Evaluation   Outcome Summary/Follow up Plan Patient has 3 small, circular areas on his legs-Right upper thigh, right lower shin, and Left upper thigh; all are red with yellow centers; BLE edematous; c/o pain with pain meds given x1; collagenease applied and gauze 4x4 placed over them.        Goal: Adult Individualization and Mutuality  Outcome: Ongoing (interventions implemented as appropriate)  Goal: Discharge Needs Assessment  Outcome: Ongoing (interventions implemented as appropriate)    Problem: Cellulitis (Adult)  Goal: Signs and Symptoms of Listed Potential Problems Will be Absent or Manageable (Cellulitis)  Outcome: Ongoing (interventions implemented as appropriate)

## 2017-04-11 NOTE — CONSULTS
Keli Harrison MD Consult Note      Patient Care Team:  John Recio MD as PCP - General (Internal Medicine)  Erwin Bernard MD as Consulting Physician (Otolaryngology)  Jaguar Mota MD as Consulting Physician (Urology)  ARNULFO Catherine as Nurse Practitioner (Otolaryngology)    Chief complaint multiple bilateral lower extremity abscesses    Subjective .     History of present illness:  Patient had 3 areas on his lower extremities with induration.  He is on Cipro as an outpatient but these worsen so he is admitted for IV antibiotics and surgical consultation.  Does not remember trauma or spider bites or any other inciting event    Review of Systems  Pertinent items are noted in HPI, all other systems reviewed and negative    History  Past Medical History:   Diagnosis Date   • Deviated nasal septum    • Hyperlipidemia    • Hypertension    • Hypertrophy of inferior nasal turbinate    • Kidney stones    • Obstructive sleep apnea    • Sleep apnea    • Snoring    , No past surgical history on file., Family History   Problem Relation Age of Onset   • No Known Problems Father    • No Known Problems Mother    , Social History   Substance Use Topics   • Smoking status: Never Smoker   • Smokeless tobacco: Not on file   • Alcohol use No   , Prescriptions Prior to Admission   Medication Sig Dispense Refill Last Dose   • atorvastatin (LIPITOR) 40 MG tablet Take 40 mg by mouth daily   Taking   • cetirizine (zyrTEC) 10 MG tablet Take 10 mg by mouth daily   Taking   • fluticasone (FLONASE) 50 MCG/ACT nasal spray INSTILL 2 PUFFS INTO EACH NOSTRIL ONCE DAILY  1 Taking   • lisinopril (PRINIVIL,ZESTRIL) 40 MG tablet Take 40 mg by mouth daily   Taking   • olopatadine (PATANASE) 0.6 % solution nasal solution 2 sprays by Each Nare route 2 (Two) Times a Day. 1 bottle 5    • therapeutic multivitamin-minerals (THERAGRAN-M) tablet Take 1 tablet by mouth daily   Taking   , Scheduled Meds:    [MAR Hold] atorvastatin 40 mg  Oral Nightly   [MAR Hold] cetirizine 10 mg Oral Daily   [MAR Hold] collagenase  Topical Q12H   [MAR Hold] enoxaparin 40 mg Subcutaneous Daily   lisinopril 40 mg Oral Q24H   [MAR Hold] vancomycin 1,000 mg Intravenous Q8H   , Continuous Infusions:    lactated ringers 100 mL/hr Last Rate: 100 mL/hr (04/11/17 0976)   Pharmacy to dose vancomycin     sodium chloride 100 mL/hr Last Rate: 100 mL/hr (04/10/17 1854)   , PRN Meds:  [MAR Hold] ketorolac  •  midazolam **OR** midazolam  •  [MAR Hold] Morphine **AND** [MAR Hold] naloxone  •  [MAR Hold] ondansetron  •  Pharmacy to dose vancomycin  •  sodium chloride  •  [MAR Hold] sodium chloride  •  sodium chloride and Allergies:  Measles, mumps & rubella vac and Tetanus toxoids    Objective     Vital Signs   Temp:  [98.1 °F (36.7 °C)-100.1 °F (37.8 °C)] 98.2 °F (36.8 °C)  Heart Rate:  [] 80  Resp:  [16-18] 18  BP: (106-149)/(59-78) 120/59    Intake & Output (last 3 days)       04/08 0701 - 04/09 0700 04/09 0701 - 04/10 0700 04/10 0701 - 04/11 0700 04/11 0701 - 04/12 0700            Unmeasured Urine Occurrence   2 x            Physical Exam:     General Appearance:    Alert, cooperative, in no acute distress   Head:    Normocephalic, without obvious abnormality, atraumatic   Eyes:            Lids and lashes normal, conjunctivae and sclerae normal, no   icterus, no pallor, corneas clear, PERRLA   Ears:    Ears appear intact with no abnormalities noted   Neck:   No adenopathy, supple, trachea midline   Back:     No kyphosis present, no scoliosis present, no skin lesions,      erythema or scars, no tenderness to percussion or                   palpation,  range of motion normal   Lungs:     Clear to auscultation,respirations regular, even and                  unlabored    Heart:    Regular rhythm and normal rate, normal S1 and S2, no            murmur, no gallop, no rub, no click   Chest Wall:    No abnormalities observed   Abdomen:     soft nontender    Rectal:     Deferred    Extremities:   Moves all extremities well, no edema, no cyanosis, 3 areas of central necrosis with purulent drainage and surrounding areas of erythema.  The right thigh has a central area of approximately 2 cm with induration extending about 8 cm.  Right thigh shows a 3 cm necrosis with 12 cm induration right lower leg shows 2 cm necrosis and 7 cm induration.  Expressible purulence is noted at each site.     Pulses:   Pulses palpable and equal bilaterally   Skin:   No bleeding, bruising or rash   Lymph nodes:   No palpable adenopathy   Neurologic:   No focal deficits       Lab Results (last 72 hours)     Procedure Component Value Units Date/Time    CBC Auto Differential [[id number]]  (Abnormal) Collected:  04/10/17 1832    Specimen:  Blood Updated:  04/10/17 1917     WBC 12.02 (H) 10*3/mm3      RBC 4.56 (L) 10*6/mm3      Hemoglobin 12.3 (L) g/dL      Hematocrit 37.2 (L) %      MCV 81.6 (L) fL      MCH 27.0 (L) pg      MCHC 33.1 g/dL      RDW 14.2 %      RDW-SD 42.6 fl      MPV 9.6 fL      Platelets 298 10*3/mm3      Neutrophil % 79.5 (H) %      Lymphocyte % 12.0 (L) %      Monocyte % 6.4 %      Eosinophil % 1.7 %      Basophil % 0.2 %      Immature Grans % 0.2 %      Neutrophils, Absolute 9.57 (H) 10*3/mm3      Lymphocytes, Absolute 1.44 10*3/mm3      Monocytes, Absolute 0.77 10*3/mm3      Eosinophils, Absolute 0.20 10*3/mm3      Basophils, Absolute 0.02 10*3/mm3      Immature Grans, Absolute 0.02 10*3/mm3     Comprehensive Metabolic Panel [[id number]]  (Abnormal) Collected:  04/10/17 1832    Specimen:  Blood Updated:  04/10/17 1935     Glucose 96 mg/dL      BUN 14 mg/dL      Creatinine 1.00 mg/dL      Sodium 144 mmol/L      Potassium 4.0 mmol/L      Chloride 102 mmol/L      CO2 25.0 mmol/L      Calcium 9.1 mg/dL      Total Protein 7.3 g/dL      Albumin 4.20 g/dL      ALT (SGPT) 35 U/L      AST (SGOT) 29 U/L      Alkaline Phosphatase 112 U/L      Total Bilirubin 0.6 mg/dL      eGFR Non African Amer 82 mL/min/1.73       Globulin 3.1 gm/dL      A/G Ratio 1.4 g/dL      BUN/Creatinine Ratio 14.0     Anion Gap 17.0 (H) mmol/L     CK [[id number]]  (Normal) Collected:  04/10/17 1832    Specimen:  Blood Updated:  04/10/17 1935     Creatine Kinase 131 U/L     Blood Culture With ALEX [[id number]]  (Normal) Collected:  04/10/17 1858    Specimen:  Blood from Arm, Right Updated:  04/11/17 0801     Blood Culture No growth at less than 24 hours    Blood Culture With ALEX [[id number]]  (Normal) Collected:  04/10/17 1912    Specimen:  Blood from Arm, Left Updated:  04/11/17 0801     Blood Culture No growth at less than 24 hours    Wound Culture [[id number]]  (Abnormal) Collected:  04/10/17 1852    Specimen:  Wound from Thigh, Right Updated:  04/11/17 0835     Wound Culture Moderate growth (3+) Staphylococcus aureus (A)    Anaerobic Culture [[id number]] Collected:  04/11/17 1035    Specimen:  Tissue from Leg, Left Updated:  04/11/17 1049    AFB Culture [[id number]] Collected:  04/11/17 1035    Specimen:  Tissue from Leg, Left Updated:  04/11/17 1049    Anaerobic Culture [[id number]] Collected:  04/11/17 1035    Specimen:  Tissue from Leg, Right Updated:  04/11/17 1049    AFB Culture [[id number]] Collected:  04/11/17 1035    Specimen:  Tissue from Leg, Right Updated:  04/11/17 1049        Imaging Results (last 72 hours)     ** No results found for the last 72 hours. **                Assessment/Plan     Active Problems:    Thigh abscess    Ear ache    Essential hypertension    Hyperlipidemia    Abscess of thigh      These will require excision and drainage of these areas.  There we left open for wet-to-dry dressing changes.  Risks of poor healing all discussed.      Keli Harrison MD  04/11/17  10:49 AM

## 2017-04-11 NOTE — OP NOTE
Keli Harrison MD Operative Note    Jaguar Gallegos  4/10/2017 - 4/11/2017    Pre-op Diagnosis:   * No pre-op diagnosis entered *    Post-op Diagnosis:     same    Procedure/CPT® Codes:      Procedure(s):  INCISION AND DRAINAGE BILATERAL  LOWER EXTREMITIES    Surgeon(s):  Keli Harrison MD    Anesthesia: General    Staff:   Circulator: Charley Chang RN; Erik Duarte RN  Scrub Person: Debbie Rao; Brayan Hong  Assistant: Griselda Brennan    Estimated Blood Loss: minimal    Specimens:                  ID Type Source Tests Collected by Time Destination   1 : TISSUE FROM  LEFT LATERAL THIGH FOR CULTURES Tissue Leg, Left ANAEROBIC CULTURE, AFB CULTURE Keli Harrison MD 4/11/2017 1035    2 : TISSUE FROM RIGHT LATERAL THIGH & RIGHT MEDIAL CALF FOR CULTURE Tissue Leg, Right ANAEROBIC CULTURE, AFB CULTURE Keli Harrison MD 4/11/2017 1035          Drains:           Indications: 3 areas of necrotic/purulent infection bilateral thighs and right lower extremity    Findings: Some purulence and indurated tissue the wound on the left thigh measured 5 x 3 cm.  Right thigh 8 x 8 cm right lower leg 5 x 3 cm    Complications: none    Procedure: The patient was brought to the operating room and placed in the supine position.  After induction of general anesthesia and infusion of IV antibiotics, the patient was prepped and draped in the usual sterile fashion.  All 3 areas were addressed with elliptical incisions as stated above.  Subcutaneous taste tissue were dissected with left cautery.  Hemostasis was obtained and copious irrigation was performed.  All 3 were superficial extending only into the deep subcutaneous tissue but not down into the muscle.  All wounds were then packed open.  Dressing was placed and patient was awakened and transferred to the recovery room in stable condition tolerated the procedure well.  At the end of the procedure all counts were correct.    Keli Harrison MD     Date: 4/11/2017   Time: 10:45 AM

## 2017-04-11 NOTE — PROGRESS NOTES
[facility] Medicine Services  INPATIENT PROGRESS NOTE    Length of Stay: 1  Date of Admission: 4/10/2017  Primary Care Physician: John Recio MD    Subjective     Chief Complaint: Follow up   HPI     Patient doing better he tolerated the incision and drainage of abscesses this morning.  Afebrile  no nausea, vomiting, abdominal pain, chest pain    no reported otalgia now or ear drainage        Review of Systems     All pertinent negatives and positives are as above. All other systems have been reviewed and are negative unless otherwise stated.     Objective    Temp:  [97.9 °F (36.6 °C)-100.1 °F (37.8 °C)] 97.9 °F (36.6 °C)  Heart Rate:  [] 80  Resp:  [12-18] 16  BP: ()/(59-78) 123/62  Physical Exam  Constitutional: He is oriented to person, place, and time. He appears well-developed and well-nourished.   HENT:   Head: Normocephalic and atraumatic.   Mouth/Throat: Oropharynx is clear and moist. No oropharyngeal exudate.   Eyes: Conjunctivae and EOM are normal. Pupils are equal, round, . No scleral icterus.   Neck: Normal range of motion. Neck supple. No JVD present. No tracheal deviation present. No thyromegaly present.   Cardiovascular: Normal rate, regular rhythm and normal heart sounds.   Pulmonary/Chest: Effort normal and breath sounds normal.   Abdominal: Soft. Bowel sounds are normal. He exhibits no distension. There is no tenderness.   Musculoskeletal: Normal range of motion. He exhibits edema.   Lymphadenopathy:   He has no cervical adenopathy.   Neurological: He is alert and oriented to person, place, and time.   Skin:  Patient is status post incision and drainage the wounds are packed with gauze wet with saline solution.  I noted a crater of wounds from incision and drainage.  There is no significant erythema or drainage noted in all this wounds.         Psychiatric: He has a normal mood and affect. His behavior is normal. Judgment and thought content  normal.             Results Review:  I have reviewed the labs, radiology results, and diagnostic studies.    Laboratory Data:     Results from last 7 days  Lab Units 04/10/17  1832   WBC 10*3/mm3 12.02*   HEMOGLOBIN g/dL 12.3*   HEMATOCRIT % 37.2*   PLATELETS 10*3/mm3 298          Results from last 7 days  Lab Units 04/10/17  1832   SODIUM mmol/L 144   POTASSIUM mmol/L 4.0   CHLORIDE mmol/L 102   TOTAL CO2 mmol/L 25.0   BUN mg/dL 14   CREATININE mg/dL 1.00   CALCIUM mg/dL 9.1   BILIRUBIN mg/dL 0.6   ALK PHOS U/L 112   ALT (SGPT) U/L 35   AST (SGOT) U/L 29   GLUCOSE mg/dL 96       Culture Data:   Blood Culture   Date Value Ref Range Status   04/10/2017 No growth at less than 24 hours  Preliminary   04/10/2017 No growth at less than 24 hours  Preliminary     Wound Culture   Date Value Ref Range Status   04/10/2017 Moderate growth (3+) Staphylococcus aureus (A)  Preliminary       Radiology Data:   Imaging Results (last 24 hours)     ** No results found for the last 24 hours. **          I have reviewed the patient current medications.     Assessment/Plan     Hospital Problem List     Thigh abscess    Ear ache    Essential hypertension    Hyperlipidemia    Abscess of thigh          bilateral thigh  Abscesses and right leg abscess/skin necrosis - failure of outpatient treatment with antibiotic; culture showing Staphylococcus aureus  Otalgia right ear  Hypertension  Hyperlipidemia    Continue IV antibiotic pending culture report  Local wound care as discussed with Dr. Harrison  Continue supportive care  Increase activities  Other plan per orders  Discussed with wife and patient        Discharge Planning: Anticipate possible discharge tomorrow    Raffaele Obrien MD   04/11/17   2:13 PM

## 2017-04-11 NOTE — ANESTHESIA PREPROCEDURE EVALUATION
Anesthesia Evaluation     Patient summary reviewed and Nursing notes reviewed   no history of anesthetic complications:  NPO Status: > 8 hours   Airway   Mallampati: I  TM distance: >3 FB  Neck ROM: full  no difficulty expected  Dental - normal exam     Pulmonary - normal exam   (+) sleep apnea on CPAP,   Cardiovascular - normal exam    (+) hypertension, hyperlipidemia      Neuro/Psych  GI/Hepatic/Renal/Endo    (+) obesity,      Musculoskeletal     Abdominal    Substance History      OB/GYN          Other          Other Comment: Multiple lower extremity abscesses                              Anesthesia Plan    ASA 2     general     intravenous induction   Anesthetic plan and risks discussed with patient.

## 2017-04-11 NOTE — ANESTHESIA POSTPROCEDURE EVALUATION
Patient: Jaguar Gallegos    Procedure Summary     Date Anesthesia Start Anesthesia Stop Room / Location    04/11/17 1000 1056  [facility] OR 04 / [facility] OR       Procedure Diagnosis Surgeon Provider    INCISION AND DRAINAGE BILATERAL  LOWER EXTREMITIES (Bilateral ) No diagnosis on file. MD Leodan Dewitt CRNA          Anesthesia Type: general  Last vitals  /64 (04/11/17 1216)    Temp 98.9 °F (37.2 °C) (04/11/17 1216)    Pulse 77 (04/11/17 1216)   Resp 16 (04/11/17 1216)    SpO2 93 % (04/11/17 1216)      Post Anesthesia Care and Evaluation    Patient location during evaluation: PACU  Patient participation: complete - patient participated  Level of consciousness: awake and alert  Pain score: 0  Pain management: adequate  Airway patency: patent  Anesthetic complications: No anesthetic complications    Cardiovascular status: acceptable and stable  Respiratory status: acceptable  Hydration status: acceptable

## 2017-04-11 NOTE — ANESTHESIA PROCEDURE NOTES
Airway  Urgency: elective    Date/Time: 4/11/2017 10:11 AM  Airway not difficult    General Information and Staff    Patient location during procedure: OR  CRNA: CINDY [id number]    Indications and Patient Condition  Indications for airway management: airway protection    Preoxygenated: yes  MILS maintained throughout  Mask difficulty assessment: 1 - vent by mask    Final Airway Details  Final airway type: endotracheal airway      Successful airway: ETT  Cuffed: yes   Successful intubation technique: direct laryngoscopy  Endotracheal tube insertion site: oral  Blade: Flores  Blade size: #2  ETT size: 8.0 mm  Cormack-Lehane Classification: grade I - full view of glottis  Placement verified by: chest auscultation and capnometry   Cuff volume (mL): 5  Measured from: gums  ETT to gums (cm): 21  Number of attempts at approach: 1    Additional Comments  Atraumatic

## 2017-04-11 NOTE — PAYOR COMM NOTE
"ADMIT INPT 4-10-17  [id number]        ElJaguar (42 y.o. Male)     Date of Birth Social Security Number Address Home Phone MRN    1975  [facility] [address]  \[facility] [place] [facility]\""[place] [address] [phone] [id number]    M[employer]on Marital Status          None        Admission Date Admission Type Admitting Provider Attending Provider Department, Room/Bed    4/10/17 Urgent Raffaele Obrien MD [place], Glenn Riego, MD [facility] 3C, 364/1    Discharge Date Discharge Disposition Discharge Destination                      Attending Provider: Raffaele Obrien MD     Allergies:  Measles, Mumps & Rubella Vac, Tetanus Toxoids    Isolation:  None   Infection:  None   Code Status:  FULL    Ht:  69\" (175.3 cm)   Wt:  256 lb 6.4 oz (116 kg)    Admission Cmt:  None   Principal Problem:  None                Active Insurance as of 4/10/2017     Primary Coverage     Payor Plan Insurance Group Employer/Plan Group    [employer] BLUE CR[employer] EMPLOYEE [id number]     Payor Plan Address Payor Plan Phone Number Effective From Effective To    [address] [phone] 1/1/2017     [place] [address]       Subscriber Name Subscriber Birth Date Member ID       MARY CASANOVA [birth year]1978 [id number]                 Emergency Contacts      (Rel.) Home Phone Work Phone Mobile Phone    ElMary (Spouse) [phone] -- --               History & Physical      Raffaele Obrien MD at 4/10/2017  5:22 PM              [facility] Medicine Services  HISTORY AND PHYSICAL    Date of Admission: 4/10/2017  Primary Care Physician: John Recio MD    Subjective     Chief Complaint: sent as direct admit by Griselda Diaz - felt will require IV abxs for soft tissue infection    History of Present Illness  Past Saturday -  Ear hurting  Right side - went to clinic  --- TMJ  disease  Last week still continue to hurt;   The prior " "Thursday, went to see a dentist for cleaning and X-ray - everything was normal  This past Thursday, he went to [facility] -- \"ear canal and drum infection\"; he also showed \"spots\" in his legs/lower extremities  She prescribed Cipro and \"eardrop\"  With regards the spot, it was assumed that Cipro would cover the ear and \"spots\"  The spot spread and been burning.  Questionable spider bite on right leg  Friction rub from keys in his pockets climbing ladder (part of job - )      Review of Systems   Felt feverish, chills  No nausea, vomiting  + aching lower extremities  no  Urinary or bowel disturbance  Uncomfortable in sitting  Otherwise complete ROS reviewed and negative except as mentioned in the HPI.      Past Medical History:   Past Medical History:   Diagnosis Date   • Deviated nasal septum    • Hyperlipidemia    • Hypertension    • Hypertrophy of inferior nasal turbinate    • Kidney stones    • Obstructive sleep apnea    • Sleep apnea    • Snoring        Past Surgical History: Vasectomy - March 16, 2017    Social History:  reports that he has never smoked. He does not have any smokeless tobacco history on file. Drug use questions deferred to the physician. He reports that he does not drink alcohol.     Family History: Hypertension, hyperlipidemia and    Heart disease    Allergies:  Allergies   Allergen Reactions   • Measles, Mumps & Rubella Vac    • Tetanus Toxoids      INCLUDES DTP ALLERGY  INCLUDES DTP ALLERGY       Medications:  Prior to Admission medications    Medication Sig Start Date End Date Taking? Authorizing Provider   atorvastatin (LIPITOR) 40 MG tablet Take 40 mg by mouth daily    Historical Provider, MD   cetirizine (zyrTEC) 10 MG tablet Take 10 mg by mouth daily    Historical Provider, MD   fluticasone (FLONASE) 50 MCG/ACT nasal spray INSTILL 2 PUFFS INTO EACH NOSTRIL ONCE DAILY 1/3/17   Historical Provider, MD   lisinopril (PRINIVIL,ZESTRIL) 40 MG tablet Take 40 mg " by mouth daily    Historical Provider, MD   olopatadine (PATANASE) 0.6 % solution nasal solution 2 sprays by Each Nare route 2 (Two) Times a Day. 2/21/17   ARNULFO Catherine   therapeutic multivitamin-minerals (THERAGRAN-M) tablet Take 1 tablet by mouth daily    Historical Provider, MD       Objective     Vital Signs: There were no vitals taken for this visit.  Physical Exam   Constitutional: He is oriented to person, place, and time. He appears well-developed and well-nourished.   HENT:   Head: Normocephalic and atraumatic.   Right Ear: External ear normal.   Left Ear: External ear normal.   Nose: Nose normal.   Mouth/Throat: Oropharynx is clear and moist. No oropharyngeal exudate.   Eyes: Conjunctivae and EOM are normal. Pupils are equal, round, and reactive to light. No scleral icterus.   Neck: Normal range of motion. Neck supple. No JVD present. No tracheal deviation present. No thyromegaly present.   Cardiovascular: Normal rate, regular rhythm and normal heart sounds.    Pulmonary/Chest: Effort normal and breath sounds normal.   Abdominal: Soft. Bowel sounds are normal. He exhibits no distension. There is no tenderness.   Musculoskeletal: Normal range of motion. He exhibits edema.   Lymphadenopathy:     He has no cervical adenopathy.   Neurological: He is alert and oriented to person, place, and time.   Skin: Skin is warm and dry. No erythema.        Psychiatric: He has a normal mood and affect. His behavior is normal. Judgment and thought content normal.           Results Reviewed:  Lab Results (last 24 hours)     ** No results found for the last 24 hours. **        Imaging Results (last 24 hours)     ** No results found for the last 24 hours. **          I have personally reviewed and interpreted the radiology studies and ECG obtained at time of admission.     Assessment / Plan     Assessment & Plan    Abscesses bilateral thigh and left leg  Limb edema sec. To above  Pain sec. To above  Right ear  otalgia  Hypertension  Hyperlipidemia  Sleep apnea - cpap      Vancomycin; pharmacy to dose  Wound and blood culture  Resume home medications except cipro  Other plans per orders  Warm compress  Collagenase on right leg wound  Labs  Discussed with Rosita      Code Status: full     I discussed the patients findings and my recommendations with  patinet and wife  Estimated length of stay 3-4 days    Raffaele Obrien MD   04/10/17   5:23 PM               Electronically signed by Raffaele Obrien MD at 4/10/2017  6:21 PM        Vital Signs (last 24 hours)       04/10 0700  -  04/11 0659 04/11 0700  -  04/11 0838   Most Recent    Temp (°F) 98.1 -  100.1       98.1 (36.7)    Heart Rate 67 -  105       67    Resp 16 -  18       18    /61 -  149/78       106/61    SpO2 (%) 97 -  99       99          Intake & Output (last day)       04/10 0701 - 04/11 0700 04/11 0701 - 04/12 0700          Unmeasured Urine Occurrence 2 x         Lines, Drains & Airways    Active LDAs     None         Inactive LDAs     None                Hospital Medications (all)       Dose Frequency Start End    atorvastatin (LIPITOR) tablet 40 mg 40 mg Nightly 4/10/2017     Sig - Route: Take 1 tablet by mouth Every Night. - Oral    cetirizine (zyrTEC) tablet 10 mg 10 mg Daily 4/10/2017     Sig - Route: Take 1 tablet by mouth Daily. - Oral    collagenase ointment  Every 12 Hours Scheduled 4/10/2017 4/12/2017    Sig - Route: Apply  topically Every 12 (Twelve) Hours. - Topical    enoxaparin (LOVENOX) syringe 40 mg 40 mg Daily 4/10/2017     Sig - Route: Inject 0.4 mL under the skin Daily. - Subcutaneous    ketorolac (TORADOL) injection 30 mg 30 mg Every 6 Hours PRN 4/10/2017 4/11/2017    Sig - Route: Infuse 30 mg into a venous catheter Every 6 (Six) Hours As Needed for Moderate Pain (4-6). - Intravenous    lisinopril (PRINIVIL,ZESTRIL) tablet 40 mg 40 mg Every 24 Hours Scheduled 4/10/2017     Sig - Route: Take 2 tablets by mouth Daily. -  "Oral    Morphine sulfate (PF) injection 1 mg 1 mg Every 4 Hours PRN 4/10/2017 4/20/2017    Sig - Route: Infuse 0.5 mL into a venous catheter Every 4 (Four) Hours As Needed for Severe Pain (7-10). - Intravenous    Linked Group 1:  \"And\" Linked Group Details        naloxone (NARCAN) injection 0.4 mg 0.4 mg Every 5 Minutes PRN 4/10/2017     Sig - Route: Infuse 1 mL into a venous catheter Every 5 (Five) Minutes As Needed for Respiratory Depression. - Intravenous    Linked Group 1:  \"And\" Linked Group Details        ondansetron (ZOFRAN) injection 4 mg 4 mg Every 6 Hours PRN 4/10/2017     Sig - Route: Infuse 2 mL into a venous catheter Every 6 (Six) Hours As Needed for Nausea or Vomiting. - Intravenous    Pharmacy to dose vancomycin  Continuous PRN 4/10/2017     Sig - Route: Continuous As Needed for Consult. - Does not apply    sodium chloride 0.9 % flush 1-10 mL 1-10 mL As Needed 4/10/2017     Sig - Route: Infuse 1-10 mL into a venous catheter As Needed for Line Care. - Intravenous    sodium chloride 0.9 % infusion 100 mL/hr Continuous 4/10/2017     Sig - Route: Infuse 100 mL/hr into a venous catheter Continuous. - Intravenous    vancomycin (VANCOCIN) in iso-osmotic dextrose IVPB 1 g (premix) 200 mL 1,000 mg Every 8 Hours 4/10/2017     Sig - Route: Infuse 200 mL into a venous catheter Every 8 (Eight) Hours. - Intravenous            Lab Results (last 24 hours)     Procedure Component Value Units Date/Time    CBC Auto Differential [[id number]]  (Abnormal) Collected:  04/10/17 1832    Specimen:  Blood Updated:  04/10/17 1917     WBC 12.02 (H) 10*3/mm3      RBC 4.56 (L) 10*6/mm3      Hemoglobin 12.3 (L) g/dL      Hematocrit 37.2 (L) %      MCV 81.6 (L) fL      MCH 27.0 (L) pg      MCHC 33.1 g/dL      RDW 14.2 %      RDW-SD 42.6 fl      MPV 9.6 fL      Platelets 298 10*3/mm3      Neutrophil % 79.5 (H) %      Lymphocyte % 12.0 (L) %      Monocyte % 6.4 %      Eosinophil % 1.7 %      Basophil % 0.2 %      Immature Grans % 0.2 %  "     Neutrophils, Absolute 9.57 (H) 10*3/mm3      Lymphocytes, Absolute 1.44 10*3/mm3      Monocytes, Absolute 0.77 10*3/mm3      Eosinophils, Absolute 0.20 10*3/mm3      Basophils, Absolute 0.02 10*3/mm3      Immature Grans, Absolute 0.02 10*3/mm3     Comprehensive Metabolic Panel [[id number]]  (Abnormal) Collected:  04/10/17 1832    Specimen:  Blood Updated:  04/10/17 1935     Glucose 96 mg/dL      BUN 14 mg/dL      Creatinine 1.00 mg/dL      Sodium 144 mmol/L      Potassium 4.0 mmol/L      Chloride 102 mmol/L      CO2 25.0 mmol/L      Calcium 9.1 mg/dL      Total Protein 7.3 g/dL      Albumin 4.20 g/dL      ALT (SGPT) 35 U/L      AST (SGOT) 29 U/L      Alkaline Phosphatase 112 U/L      Total Bilirubin 0.6 mg/dL      eGFR Non African Amer 82 mL/min/1.73      Globulin 3.1 gm/dL      A/G Ratio 1.4 g/dL      BUN/Creatinine Ratio 14.0     Anion Gap 17.0 (H) mmol/L     CK [[id number]]  (Normal) Collected:  04/10/17 1832    Specimen:  Blood Updated:  04/10/17 1935     Creatine Kinase 131 U/L     Blood Culture With ALEX [[id number]]  (Normal) Collected:  04/10/17 1858    Specimen:  Blood from Arm, Right Updated:  04/11/17 0801     Blood Culture No growth at less than 24 hours    Blood Culture With ALEX [[id number]]  (Normal) Collected:  04/10/17 1912    Specimen:  Blood from Arm, Left Updated:  04/11/17 0801     Blood Culture No growth at less than 24 hours    Wound Culture [[id number]]  (Abnormal) Collected:  04/10/17 1852    Specimen:  Wound from Thigh, Right Updated:  04/11/17 0835     Wound Culture Moderate growth (3+) Staphylococcus aureus (A)        Imaging Results (last 24 hours)     ** No results found for the last 24 hours. **        Orders (last 24 hrs)     Start     Ordered    04/11/17 2030  Vancomycin, Trough  Timed      04/10/17 2037    04/10/17 2130  vancomycin (VANCOCIN) in iso-osmotic dextrose IVPB 1 g (premix) 200 mL  Every 8 Hours      04/10/17 2031    04/10/17 2100  atorvastatin (LIPITOR) tablet 40 mg  Nightly       04/10/17 1808    04/10/17 2100  collagenase ointment  Every 12 Hours Scheduled      04/10/17 1816    04/10/17 2000  Vital Signs  Every 4 Hours      04/10/17 1808    04/10/17 1910  Blood Culture With ALEX  Once      04/10/17 1909    04/10/17 1900  Strict Intake and Output  Every Hour      04/10/17 1808    04/10/17 1845  cetirizine (zyrTEC) tablet 10 mg  Daily      04/10/17 1808    04/10/17 1845  lisinopril (PRINIVIL,ZESTRIL) tablet 40 mg  Every 24 Hours Scheduled      04/10/17 1808    04/10/17 1845  enoxaparin (LOVENOX) syringe 40 mg  Daily      04/10/17 1808    04/10/17 1845  sodium chloride 0.9 % infusion  Continuous      04/10/17 1808    04/10/17 1836  Blood Culture With ALEX  Once      04/10/17 1836    04/10/17 1828  Blood Culture  Once,   Status:  Canceled     Comments:  From a different site than #1.    04/10/17 1808    04/10/17 1810  Apply Heat To Affected Area  Once     Comments:  Bilateral thighs/leg    04/10/17 1810    04/10/17 1809  Wound Culture  Once      04/10/17 1810    04/10/17 1809  Inpatient Consult to General Surgery  Once     Specialty:  General Surgery  Provider:  Keli Harrison MD    04/10/17 1810    04/10/17 1807  ondansetron (ZOFRAN) injection 4 mg  Every 6 Hours PRN      04/10/17 1808    04/10/17 1807  Morphine sulfate (PF) injection 1 mg  Every 4 Hours PRN      04/10/17 1808    04/10/17 1807  naloxone (NARCAN) injection 0.4 mg  Every 5 Minutes PRN      04/10/17 1808    04/10/17 1806  ketorolac (TORADOL) injection 30 mg  Every 6 Hours PRN      04/10/17 1808    04/10/17 1805  Diet Regular; Cardiac  Diet Effective Now      04/10/17 1808    04/10/17 1805  CBC Auto Differential  STAT      04/10/17 1808    04/10/17 1805  Comprehensive Metabolic Panel  STAT      04/10/17 1808    04/10/17 1805  CK  STAT      04/10/17 1808    04/10/17 1805  Blood Culture  Once,   Status:  Canceled      04/10/17 1808    04/10/17 1804  Weigh patient  Once      04/10/17 1808    04/10/17 1804  Oxygen Therapy-  Continuous       04/10/17 1808    04/10/17 1804  Insert Peripheral IV  Once      04/10/17 1808    04/10/17 1804  Saline Lock & Maintain IV Access  Continuous      04/10/17 1808    04/10/17 1804  Inpatient Admission  Once      04/10/17 1808    04/10/17 1804  Full Code  Continuous      04/10/17 1808    04/10/17 1804  VTE Risk Assessment - Moderate Risk  Once      04/10/17 1808    04/10/17 1804  Mechanical VTE Prophylaxis Not Indicated: Moderate VTE Risk With Pharmacologic Prophylaxis  Once      04/10/17 1808    04/10/17 1803  sodium chloride 0.9 % flush 1-10 mL  As Needed      04/10/17 1808    04/10/17 1802  Pharmacy to dose vancomycin  Continuous PRN      04/10/17 1808          Physician Progress Notes (last 24 hours) (Notes from 4/10/2017  8:38 AM through 4/11/2017  8:38 AM)     No notes of this type exist for this encounter.        Consult Notes (last 24 hours) (Notes from 4/10/2017  8:38 AM through 4/11/2017  8:38 AM)     No notes of this type exist for this encounter.

## 2017-04-12 VITALS
HEIGHT: 69 IN | TEMPERATURE: 98.3 F | RESPIRATION RATE: 16 BRPM | WEIGHT: 256.4 LBS | DIASTOLIC BLOOD PRESSURE: 73 MMHG | BODY MASS INDEX: 37.98 KG/M2 | SYSTOLIC BLOOD PRESSURE: 127 MMHG | OXYGEN SATURATION: 98 % | HEART RATE: 91 BPM

## 2017-04-12 LAB
BACTERIA SPEC AEROBE CULT: ABNORMAL
GRAM STN SPEC: ABNORMAL
GRAM STN SPEC: ABNORMAL
VANCOMYCIN TROUGH SERPL-MCNC: 9.84 MCG/ML (ref 10–20)

## 2017-04-12 PROCEDURE — 25010000002 VANCOMYCIN: Performed by: INTERNAL MEDICINE

## 2017-04-12 PROCEDURE — 80202 ASSAY OF VANCOMYCIN: CPT

## 2017-04-12 RX ORDER — HYDROCODONE BITARTRATE AND ACETAMINOPHEN 7.5; 325 MG/1; MG/1
1 TABLET ORAL EVERY 6 HOURS PRN
Qty: 20 TABLET | Refills: 0 | Status: SHIPPED | OUTPATIENT
Start: 2017-04-12 | End: 2019-05-09

## 2017-04-12 RX ORDER — CLINDAMYCIN HYDROCHLORIDE 300 MG/1
300 CAPSULE ORAL 4 TIMES DAILY
Qty: 40 CAPSULE | Refills: 0 | Status: SHIPPED | OUTPATIENT
Start: 2017-04-12 | End: 2017-04-22

## 2017-04-12 RX ADMIN — LISINOPRIL 40 MG: 20 TABLET ORAL at 08:44

## 2017-04-12 RX ADMIN — CETIRIZINE HYDROCHLORIDE 10 MG: 10 TABLET, FILM COATED ORAL at 08:44

## 2017-04-12 RX ADMIN — VANCOMYCIN HYDROCHLORIDE 1250 MG: 1 INJECTION, POWDER, LYOPHILIZED, FOR SOLUTION INTRAVENOUS at 07:02

## 2017-04-12 NOTE — PROGRESS NOTES
"Pharmacokinetic Follow-up Note - Vancomycin    Jaguar Gallegos is a 42 y.o. male  [Ht: 69\" (175.3 cm); Wt: 256 lb 6.4 oz (116 kg)]    Estimated Creatinine Clearance: 120.9 mL/min (by C-G formula based on Cr of 1).   Lab Results   Component Value Date    CREATININE 1.00 04/10/2017      Lab Results   Component Value Date    WBC 12.02 (H) 04/10/2017      Lab Results   Component Value Date    VANCOTROUGH 9.84 (L) 04/12/2017      Microbiology Results        Procedure Component Value Units Date/Time       Anaerobic Culture [[id number]] Collected: 04/11/17 1035       Specimen: Tissue from Leg, Right Updated: 04/11/17 1049       AFB Culture [[id number]] Collected: 04/11/17 1035       Specimen: Tissue from Leg, Right Updated: 04/11/17 1419        AFB Stain No acid fast bacilli seen on direct smear       Narrative:         Tissue from R Lateral thigh, R medial calf, and L lateral thigh       Tissue/Bone Culture [[id number]] Collected: 04/11/17 1035       Specimen: Tissue from Leg, Right Updated: 04/11/17 1154       Blood Culture With ALEX [[id number]] (Normal) Collected: 04/10/17 1912       Specimen: Blood from Arm, Left Updated: 04/11/17 2001        Blood Culture No growth at 24 hours       Blood Culture With ALEX [[id number]] (Normal) Collected: 04/10/17 1858       Specimen: Blood from Arm, Right Updated: 04/11/17 2001        Blood Culture No growth at 24 hours       Wound Culture [[id number]] (Abnormal) Collected: 04/10/17 1852       Specimen: Wound from Thigh, Right Updated: 04/11/17 1128        Wound Culture Moderate growth (3+) Staphylococcus aureus (A)        Gram Stain Result Few (2+) WBCs seen         Few (2+) Gram positive cocci             Current Vancomycin Dose:  1,000 mg IVPB every 8 hours, day 3 of therapy.    Indication for use: SSTI (abscesses of bilateral thighs and right leg - I&D performed 4/11)    Assessment/Plan:  Vancomycin trough = 9.84 (~7.75 hours post dose). Increased Vancomycin dose from 1,000mg q8h to " 1,250mg q8h. Pharmacy will continue to  monitor renal function and adjust dose accordingly.    Dionicio Perez, [facility]   04/12/17 6:17 AM

## 2017-04-12 NOTE — PLAN OF CARE
Problem: Patient Care Overview (Adult)  Goal: Plan of Care Review  Outcome: Ongoing (interventions implemented as appropriate)    04/12/17 0334   Coping/Psychosocial Response Interventions   Plan Of Care Reviewed With patient   Patient Care Overview   Progress no change   Outcome Evaluation   Outcome Summary/Follow up Plan Patient has had no complaints of pain; w-d dressing changes bid to petrona upper thighs and RLE; Vanc       Goal: Adult Individualization and Mutuality  Outcome: Ongoing (interventions implemented as appropriate)  Goal: Discharge Needs Assessment  Outcome: Ongoing (interventions implemented as appropriate)    Problem: Cellulitis (Adult)  Goal: Signs and Symptoms of Listed Potential Problems Will be Absent or Manageable (Cellulitis)  Outcome: Ongoing (interventions implemented as appropriate)    Problem: Perioperative Period (Adult)  Goal: Signs and Symptoms of Listed Potential Problems Will be Absent or Manageable (Perioperative Period)  Outcome: Ongoing (interventions implemented as appropriate)

## 2017-04-13 LAB
B-LACTAMASE USUAL SUSC ISLT: POSITIVE
BACTERIA SPEC AEROBE CULT: ABNORMAL
GRAM STN SPEC: ABNORMAL
GRAM STN SPEC: ABNORMAL

## 2017-04-15 LAB
BACTERIA SPEC AEROBE CULT: NORMAL
BACTERIA SPEC AEROBE CULT: NORMAL

## 2017-04-16 LAB — BACTERIA SPEC ANAEROBE CULT: NORMAL

## 2017-04-21 ENCOUNTER — INFUSION (OUTPATIENT)
Dept: ONCOLOGY | Facility: HOSPITAL | Age: 42
End: 2017-04-21

## 2017-04-21 VITALS
OXYGEN SATURATION: 100 % | DIASTOLIC BLOOD PRESSURE: 53 MMHG | SYSTOLIC BLOOD PRESSURE: 113 MMHG | BODY MASS INDEX: 37.22 KG/M2 | WEIGHT: 260 LBS | RESPIRATION RATE: 18 BRPM | HEIGHT: 70 IN | TEMPERATURE: 98.1 F | HEART RATE: 67 BPM

## 2017-04-21 DIAGNOSIS — L03.115 CELLULITIS OF RIGHT LOWER LIMB: Primary | ICD-10-CM

## 2017-04-21 PROCEDURE — 25010000002 DALBAVANCIN 500 MG RECONSTITUTED SOLUTION 1 EACH VIAL: Performed by: PHYSICIAN ASSISTANT

## 2017-04-21 PROCEDURE — 96365 THER/PROPH/DIAG IV INF INIT: CPT

## 2017-04-21 RX ORDER — SODIUM CHLORIDE 9 MG/ML
250 INJECTION, SOLUTION INTRAVENOUS ONCE
Status: DISCONTINUED | OUTPATIENT
Start: 2017-04-21 | End: 2017-04-21 | Stop reason: HOSPADM

## 2017-04-21 RX ORDER — SODIUM CHLORIDE 9 MG/ML
250 INJECTION, SOLUTION INTRAVENOUS ONCE
OUTPATIENT
Start: 2017-04-21

## 2017-04-21 RX ADMIN — DALBAVANCIN 1500 MG: 500 INJECTION, POWDER, FOR SOLUTION INTRAVENOUS at 15:25

## 2017-05-22 LAB
MYCOBACTERIUM SPEC CULT: NORMAL
NIGHT BLUE STAIN TISS: NORMAL
NIGHT BLUE STAIN TISS: NORMAL

## 2017-06-22 ENCOUNTER — TELEPHONE (OUTPATIENT)
Dept: UROLOGY | Facility: CLINIC | Age: 42
End: 2017-06-22

## 2017-06-22 NOTE — TELEPHONE ENCOUNTER
I have called the patient and let him know that Dr. Mota has looked at his first semen sample and it was negative. I have advised the patient to continue using birth control and to bring another sample in one month.

## 2017-08-08 ENCOUNTER — TELEPHONE (OUTPATIENT)
Dept: UROLOGY | Facility: CLINIC | Age: 42
End: 2017-08-08

## 2017-08-08 NOTE — TELEPHONE ENCOUNTER
I have called the patient and let him know that Dr. Mota has looked at his second semen sample and it was negative. I have let the patient know that his vasectomy was successful and that he can stop using birth control.

## 2018-11-16 ENCOUNTER — OFFICE VISIT (OUTPATIENT)
Dept: RETAIL CLINIC | Facility: CLINIC | Age: 43
End: 2018-11-16

## 2018-11-16 VITALS — TEMPERATURE: 97.9 F

## 2018-11-16 DIAGNOSIS — Z23 FLU VACCINE NEED: Primary | ICD-10-CM

## 2018-11-16 RX ORDER — DOXYCYCLINE HYCLATE 100 MG/1
CAPSULE ORAL
Refills: 0 | Status: ON HOLD | COMMUNITY
Start: 2018-10-10 | End: 2019-12-16

## 2018-11-16 RX ORDER — AZELASTINE 1 MG/ML
SPRAY, METERED NASAL
COMMUNITY
Start: 2015-11-09

## 2018-11-16 NOTE — PROGRESS NOTES
Jaguar Gallegos is a 43 y.o. male who presents to the clinic for a flu shot. No fever or illness today. No contraindications for flu vaccine. Jaguar filled out questionnaire and signed consent.    History of Present Illness No illness or fever    The following portions of the patient's history were reviewed and updated as appropriate: allergies, current medications, past family history, past medical history, past social history, past surgical history and problem list.        Review of Systems   Constitutional: Negative for fever.         Objective   Physical Exam   Constitutional: He is oriented to person, place, and time. He appears well-developed and well-nourished.   HENT:   Head: Normocephalic and atraumatic.   Eyes: Conjunctivae are normal. Pupils are equal, round, and reactive to light.   Neck: Neck supple.   Neurological: He is alert and oriented to person, place, and time.   Skin: Skin is warm, dry and intact.   Psychiatric: He has a normal mood and affect. His speech is normal and behavior is normal. Judgment and thought content normal.         Assessment/Plan   Influenza vaccination    VAXCARE Flu shot.  No restrictions. Flublok given see immunizations for further information.  Follow up as needed

## 2018-11-16 NOTE — PATIENT INSTRUCTIONS
VAXCARE Flu shot.  No restrictions. Flublok given see immunizations for further information.  Follow up as needed

## 2019-05-09 ENCOUNTER — OFFICE VISIT (OUTPATIENT)
Dept: GASTROENTEROLOGY | Facility: CLINIC | Age: 44
End: 2019-05-09

## 2019-05-09 VITALS
SYSTOLIC BLOOD PRESSURE: 138 MMHG | OXYGEN SATURATION: 98 % | HEIGHT: 69 IN | HEART RATE: 88 BPM | WEIGHT: 255 LBS | DIASTOLIC BLOOD PRESSURE: 82 MMHG | BODY MASS INDEX: 37.77 KG/M2

## 2019-05-09 DIAGNOSIS — I10 HTN (HYPERTENSION), BENIGN: ICD-10-CM

## 2019-05-09 DIAGNOSIS — Z80.0 FAMILY HX OF COLON CANCER: Primary | ICD-10-CM

## 2019-05-09 DIAGNOSIS — K62.5 BRBPR (BRIGHT RED BLOOD PER RECTUM): ICD-10-CM

## 2019-05-09 PROBLEM — Z86.0100 HX OF COLONIC POLYPS: Status: ACTIVE | Noted: 2019-05-09

## 2019-05-09 PROBLEM — Z86.010 HX OF COLONIC POLYPS: Status: ACTIVE | Noted: 2019-05-09

## 2019-05-09 PROCEDURE — 99214 OFFICE O/P EST MOD 30 MIN: CPT | Performed by: CLINICAL NURSE SPECIALIST

## 2019-05-09 RX ORDER — SODIUM, POTASSIUM,MAG SULFATES 17.5-3.13G
SOLUTION, RECONSTITUTED, ORAL ORAL
Qty: 2 BOTTLE | Refills: 0 | Status: ON HOLD | OUTPATIENT
Start: 2019-05-09 | End: 2019-12-16

## 2019-05-09 NOTE — PROGRESS NOTES
Jaguar Gallegos  1975 5/9/2019  Chief Complaint   Patient presents with   • Colonoscopy     Subjective   HPI  Jaguar Gallegos is a 44 y.o. male who presents as a referral for preventative maintenance. He has no complaints of nausea or vomiting. No change in bowels. No wt loss. He also has occasional BRBPR with stool intermittent. The last episode was a few days ago. No certain triggers. No melena. There is POSITIVE family hx for colon cancer. No abdominal pain.  Past Medical History:   Diagnosis Date   • Deviated nasal septum    • Hyperlipidemia    • Hypertension    • Hypertrophy of inferior nasal turbinate    • Kidney stones    • Obstructive sleep apnea    • Sleep apnea    • Snoring      Past Surgical History:   Procedure Laterality Date   • INCISION AND DRAINAGE LEG Bilateral 4/11/2017    Procedure: INCISION AND DRAINAGE BILATERAL  LOWER EXTREMITIES;  Surgeon: Keli Harrison MD;  Location: [facility] OR;  Service:      Outpatient Medications Marked as Taking for the 5/9/19 encounter (Office Visit) with Camelia Chan APRN   Medication Sig Dispense Refill   • atorvastatin (LIPITOR) 40 MG tablet Take 40 mg by mouth daily     • azelastine (ASTELIN) 0.1 % nasal spray into the nostril(s) as directed by provider.     • cetirizine (zyrTEC) 10 MG tablet Take 10 mg by mouth daily     • doxycycline (VIBRAMYCIN) 100 MG capsule TK 1 C PO D WITH FOOD  0   • fluticasone (FLONASE) 50 MCG/ACT nasal spray INSTILL 2 PUFFS INTO EACH NOSTRIL ONCE DAILY  1   • lisinopril (PRINIVIL,ZESTRIL) 40 MG tablet Take 40 mg by mouth daily     • olopatadine (PATANASE) 0.6 % solution nasal solution 2 sprays by Each Nare route 2 (Two) Times a Day. 1 bottle 5   • therapeutic multivitamin-minerals (THERAGRAN-M) tablet Take 1 tablet by mouth daily       Allergies   Allergen Reactions   • Measles, Mumps & Rubella Vac Unknown (See Comments)   • Tetanus Toxoids Unknown (See Comments)     INCLUDES DTP ALLERGY  INCLUDES DTP  "ALLERGY  INCLUDES DTP ALLERGY     Social History     Socioeconomic History   • Marital status:      Spouse name: Not on file   • Number of children: Not on file   • Years of education: Not on file   • Highest education level: Not on file   Tobacco Use   • Smoking status: Never Smoker   • Smokeless tobacco: Never Used   Substance and Sexual Activity   • Alcohol use: No   • Drug use: Defer   • Sexual activity: Defer     Family History   Problem Relation Age of Onset   • No Known Problems Father    • No Known Problems Mother    • Colon polyps Paternal Grandmother    • Colon cancer Maternal Great-Grandmother    • Colon cancer Paternal Great-Grandmother      Health Maintenance   Topic Date Due   • ANNUAL PHYSICAL  02/07/1978   • TDAP/TD VACCINES (1 - Tdap) 02/07/1994   • LIPID PANEL  04/10/2017   • INFLUENZA VACCINE  08/01/2019       REVIEW OF SYSTEMS  General: well appearing, no fever chills or sweats, no unexplained wt loss  HEENT: no acute visual or hearing disturbances  Cardiovascular: No chest pain or palpitations  Pulmonary: No shortness of breath, coughing, wheezing or hemoptysis  : No burning, urgency, hematuria, or dysuria  Musculoskeletal: No joint pain or stiffness  Peripheral: no edema  Skin: No lesions or rashes  Neuro: No dizziness, headaches, stroke, syncope  Endocrine: No hot or cold intolerances  Hematological: No blood dyscrasias    Objective   Vitals:    05/09/19 1248   BP: 138/82   Pulse: 88   SpO2: 98%   Weight: 116 kg (255 lb)   Height: 175.3 cm (69\")     Body mass index is 37.66 kg/m².  Patient's Body mass index is 37.66 kg/m². BMI is above normal parameters. Recommendations include: nutrition counseling.      PHYSICAL EXAM  General: age appropriate well nourished well appearing, no acute distress  Head: normocephalic and atraumatic  Global assessment-supple  Neck-No JVD noted, no lymphadenopathy  Pulmonary-clear to auscultation bilaterally, normal respiratory effort  Cardiovascular-normal " rate and rhythm, normal heart sounds, S1 and S2 noted  Abdomen-soft, non tender, non distended, normal bowel sounds all 4 quadrants, no hepatosplenomegaly noted  Extremities-No clubbing cyanosis or edema  Neuro-Non focal, converses appropriately, awake, alert, oriented    Assessment/Plan     Jaguar was seen today for colonoscopy.    Diagnoses and all orders for this visit:    Family hx of colon cancer  -     Case Request; Standing  -     Case Request  -     SUPREP BOWEL PREP KIT 17.5-3.13-1.6 GM/177ML solution oral solution; Take as directed by office instructions provided    BRBPR (bright red blood per rectum)    HTN (hypertension), benign  Comments:  cont BP medication the day of procedure    Other orders  -     Follow Anesthesia Guidelines / Standing Orders; Future  -     Obtain Informed Consent; Future  -     Implement Anesthesia Orders Day of Procedure; Standing  -     Obtain Informed Consent; Standing  -     Verify bowel prep was successful; Standing        COLONOSCOPY WITH ANESTHESIA (N/A)  Body mass index is 37.66 kg/m².    Patient instructions on prep prior to procedure provided to the patient.    All risks, benefits, alternatives, and indications of colonoscopy procedure have been discussed with the patient. Risks to include perforation of the colon requiring possible surgery or colostomy, risk of bleeding from biopsies or removal of colon tissue, possibility of missing a colon polyp or cancer, or adverse drug reaction.  Benefits to include the diagnosis and management of disease of the colon and rectum. Alternatives to include barium enema, radiographic evaluation, lab testing or no intervention. Pt verbalizes understanding and agrees.     Camelia Chan, APRN  2019  1:07 PM      IF YOU SMOKE OR USE TOBACCO PLEASE READ THE FOLLOWIN minutes reading provided    Why is smoking bad for me?  Smoking increases the risk of heart disease, lung disease, vascular disease, stroke, and cancer.     If  you smoke, STOP!    If you would like more information on quitting smoking, please visit the [facility] website: [email or web address]   This link will provide additional resources including the QUIT line and the Beat the Pack support groups.     For more information:    Quit Now [place]  1-800-QUIT-NOW  https://[facility]quitlogix.org/en-US/    Obesity, Adult  Obesity is the condition of having too much total body fat. Being overweight or obese means that your weight is greater than what is considered healthy for your body size. Obesity is determined by a measurement called BMI. BMI is an estimate of body fat and is calculated from height and weight. For adults, a BMI of 30 or higher is considered obese.  Obesity can eventually lead to other health concerns and major illnesses, including:  · Stroke.  · Coronary artery disease (CAD).  · Type 2 diabetes.  · Some types of cancer, including cancers of the colon, breast, uterus, and gallbladder.  · Osteoarthritis.  · High blood pressure (hypertension).  · High cholesterol.  · Sleep apnea.  · Gallbladder stones.  · Infertility problems.  What are the causes?  The main cause of obesity is taking in (consuming) more calories than your body uses for energy. Other factors that contribute to this condition may include:  · Being born with genes that make you more likely to become obese.  · Having a medical condition that causes obesity. These conditions include:  ¨ Hypothyroidism.  ¨ Polycystic ovarian syndrome (PCOS).  ¨ Binge-eating disorder.  ¨ Cushing syndrome.  · Taking certain medicines, such as steroids, antidepressants, and seizure medicines.  · Not being physically active (sedentary lifestyle).  · Living where there are limited places to exercise safely or buy healthy foods.  · Not getting enough sleep.  What increases the risk?  The following factors may increase your risk of this condition:  · Having a family history of  obesity.  · Being a woman of -American descent.  · Being a man of  descent.  What are the signs or symptoms?  Having excessive body fat is the main symptom of this condition.  How is this diagnosed?  This condition may be diagnosed based on:  · Your symptoms.  · Your medical history.  · A physical exam. Your health care provider may measure:  ¨ Your BMI. If you are an adult with a BMI between 25 and less than 30, you are considered overweight. If you are an adult with a BMI of 30 or higher, you are considered obese.  ¨ The distances around your hips and your waist (circumferences). These may be compared to each other to help diagnose your condition.  ¨ Your skinfold thickness. Your health care provider may gently pinch a fold of your skin and measure it.  How is this treated?  Treatment for this condition often includes changing your lifestyle. Treatment may include some or all of the following:  · Dietary changes. Work with your health care provider and a dietitian to set a weight-loss goal that is healthy and reasonable for you. Dietary changes may include eating:  ¨ Smaller portions. A portion size is the amount of a particular food that is healthy for you to eat at one time. This varies from person to person.  ¨ Low-calorie or low-fat options.  ¨ More whole grains, fruits, and vegetables.  · Regular physical activity. This may include aerobic activity (cardio) and strength training.  · Medicine to help you lose weight. Your health care provider may prescribe medicine if you are unable to lose 1 pound a week after 6 weeks of eating more healthily and doing more physical activity.  · Surgery. Surgical options may include gastric banding and gastric bypass. Surgery may be done if:  ¨ Other treatments have not helped to improve your condition.  ¨ You have a BMI of 40 or higher.  ¨ You have life-threatening health problems related to obesity.  Follow these instructions at home:     Eating and drinking      · Follow recommendations from your health care provider about what you eat and drink. Your health care provider may advise you to:  ¨ Limit fast foods, sweets, and processed snack foods.  ¨ Choose low-fat options, such as low-fat milk instead of whole milk.  ¨ Eat 5 or more servings of fruits or vegetables every day.  ¨ Eat at home more often. This gives you more control over what you eat.  ¨ Choose healthy foods when you eat out.  ¨ Learn what a healthy portion size is.  ¨ Keep low-fat snacks on hand.  ¨ Avoid sugary drinks, such as soda, fruit juice, iced tea sweetened with sugar, and flavored milk.  ¨ Eat a healthy breakfast.  · Drink enough water to keep your urine clear or pale yellow.  · Do not go without eating for long periods of time (do not fast) or follow a fad diet. Fasting and fad diets can be unhealthy and even dangerous.  Physical Activity   · Exercise regularly, as told by your health care provider. Ask your health care provider what types of exercise are safe for you and how often you should exercise.  · Warm up and stretch before being active.  · Cool down and stretch after being active.  · Rest between periods of activity.  Lifestyle   · Limit the time that you spend in front of your TV, computer, or video game system.  · Find ways to reward yourself that do not involve food.  · Limit alcohol intake to no more than 1 drink a day for nonpregnant women and 2 drinks a day for men. One drink equals 12 oz of beer, 5 oz of wine, or 1½ oz of hard liquor.  General instructions   · Keep a weight loss journal to keep track of the food you eat and how much you exercise you get.  · Take over-the-counter and prescription medicines only as told by your health care provider.  · Take vitamins and supplements only as told by your health care provider.  · Consider joining a support group. Your health care provider may be able to recommend a support group.  · Keep all follow-up visits as told by your health care  provider. This is important.  Contact a health care provider if:  · You are unable to meet your weight loss goal after 6 weeks of dietary and lifestyle changes.  This information is not intended to replace advice given to you by your health care provider. Make sure you discuss any questions you have with your health care provider.  Document Released: 01/25/2006 Document Revised: 05/22/2017 Document Reviewed: 10/05/2016  Elsevier Interactive Patient Education © 2017 [employer]

## 2019-11-11 ENCOUNTER — IMMUNIZATION (OUTPATIENT)
Dept: RETAIL CLINIC | Facility: CLINIC | Age: 44
End: 2019-11-11

## 2019-11-11 VITALS — TEMPERATURE: 98 F

## 2019-11-11 DIAGNOSIS — Z28.39 IMMUNIZATION DEFICIENCY: Primary | ICD-10-CM

## 2019-11-11 PROCEDURE — 90471 IMMUNIZATION ADMIN: CPT | Performed by: NURSE PRACTITIONER

## 2019-11-11 PROCEDURE — 90686 IIV4 VACC NO PRSV 0.5 ML IM: CPT | Performed by: NURSE PRACTITIONER

## 2019-11-11 NOTE — PROGRESS NOTES
Jaguar Gallegos is a 44 y.o. male who presents to the clinic for a flu shot. No fever or illness today. No contraindications for flu vaccine. Jaguar filled out questionnaire and signed consent.    History of Present Illness No illness or fever    The following portions of the patient's history were reviewed and updated as appropriate: allergies, current medications, past family history, past medical history, past social history, past surgical history and problem list.        Review of Systems   Constitutional: Negative for fever.         Objective   Physical Exam   Constitutional: He is oriented to person, place, and time. He appears well-developed and well-nourished.   HENT:   Head: Normocephalic and atraumatic.   Eyes: Pupils are equal, round, and reactive to light.   Neck: Neck supple.   Neurological: He is alert and oriented to person, place, and time.   Skin: Skin is warm, dry and intact.   Psychiatric: He has a normal mood and affect. His speech is normal and behavior is normal. Judgment and thought content normal.         Assessment/Plan   Influenza vaccination    Religion flu shot consent filled out for private shot.  Flu shot to be billed to insurance.  Follow up as needed.

## 2019-11-11 NOTE — PATIENT INSTRUCTIONS
Anglican flu shot consent filled out for private shot.  Flu shot to be billed to insurance.  Follow up as needed.

## 2019-12-16 ENCOUNTER — ANESTHESIA (OUTPATIENT)
Dept: GASTROENTEROLOGY | Facility: HOSPITAL | Age: 44
End: 2019-12-16

## 2019-12-16 ENCOUNTER — HOSPITAL ENCOUNTER (OUTPATIENT)
Facility: HOSPITAL | Age: 44
Setting detail: HOSPITAL OUTPATIENT SURGERY
Discharge: HOME | End: 2019-12-16
Attending: INTERNAL MEDICINE | Admitting: INTERNAL MEDICINE

## 2019-12-16 ENCOUNTER — ANESTHESIA EVENT (OUTPATIENT)
Dept: GASTROENTEROLOGY | Facility: HOSPITAL | Age: 44
End: 2019-12-16

## 2019-12-16 ENCOUNTER — TELEPHONE (OUTPATIENT)
Dept: GASTROENTEROLOGY | Facility: CLINIC | Age: 44
End: 2019-12-16

## 2019-12-16 VITALS
RESPIRATION RATE: 15 BRPM | SYSTOLIC BLOOD PRESSURE: 100 MMHG | DIASTOLIC BLOOD PRESSURE: 52 MMHG | OXYGEN SATURATION: 98 % | WEIGHT: 275 LBS | TEMPERATURE: 97.5 F | BODY MASS INDEX: 40.73 KG/M2 | HEIGHT: 69 IN | HEART RATE: 76 BPM

## 2019-12-16 DIAGNOSIS — Z80.0 FAMILY HX OF COLON CANCER: ICD-10-CM

## 2019-12-16 PROCEDURE — 25010000002 PROPOFOL 10 MG/ML EMULSION: Performed by: NURSE ANESTHETIST, CERTIFIED REGISTERED

## 2019-12-16 PROCEDURE — 88305 TISSUE EXAM BY PATHOLOGIST: CPT | Performed by: INTERNAL MEDICINE

## 2019-12-16 PROCEDURE — 45385 COLONOSCOPY W/LESION REMOVAL: CPT | Performed by: INTERNAL MEDICINE

## 2019-12-16 RX ORDER — SODIUM CHLORIDE 0.9 % (FLUSH) 0.9 %
10 SYRINGE (ML) INJECTION AS NEEDED
Status: CANCELLED | OUTPATIENT
Start: 2019-12-16

## 2019-12-16 RX ORDER — PROPOFOL 10 MG/ML
VIAL (ML) INTRAVENOUS AS NEEDED
Status: DISCONTINUED | OUTPATIENT
Start: 2019-12-16 | End: 2019-12-16 | Stop reason: SURG

## 2019-12-16 RX ORDER — SODIUM CHLORIDE 9 MG/ML
500 INJECTION, SOLUTION INTRAVENOUS CONTINUOUS PRN
Status: DISCONTINUED | OUTPATIENT
Start: 2019-12-16 | End: 2019-12-16 | Stop reason: HOSPADM

## 2019-12-16 RX ORDER — SODIUM CHLORIDE 9 MG/ML
100 INJECTION, SOLUTION INTRAVENOUS CONTINUOUS
Status: CANCELLED | OUTPATIENT
Start: 2019-12-16

## 2019-12-16 RX ORDER — SODIUM CHLORIDE 0.9 % (FLUSH) 0.9 %
10 SYRINGE (ML) INJECTION EVERY 12 HOURS SCHEDULED
Status: CANCELLED | OUTPATIENT
Start: 2019-12-16

## 2019-12-16 RX ORDER — SODIUM CHLORIDE 0.9 % (FLUSH) 0.9 %
10 SYRINGE (ML) INJECTION AS NEEDED
Status: DISCONTINUED | OUTPATIENT
Start: 2019-12-16 | End: 2019-12-16 | Stop reason: HOSPADM

## 2019-12-16 RX ADMIN — PROPOFOL 50 MG: 10 INJECTION, EMULSION INTRAVENOUS at 10:17

## 2019-12-16 RX ADMIN — PROPOFOL 50 MG: 10 INJECTION, EMULSION INTRAVENOUS at 10:16

## 2019-12-16 RX ADMIN — SODIUM CHLORIDE 500 ML: 9 INJECTION, SOLUTION INTRAVENOUS at 08:21

## 2019-12-16 RX ADMIN — PROPOFOL 50 MG: 10 INJECTION, EMULSION INTRAVENOUS at 10:22

## 2019-12-16 RX ADMIN — PROPOFOL 50 MG: 10 INJECTION, EMULSION INTRAVENOUS at 10:20

## 2019-12-16 RX ADMIN — PROPOFOL 50 MG: 10 INJECTION, EMULSION INTRAVENOUS at 10:18

## 2019-12-16 RX ADMIN — PROPOFOL 50 MG: 10 INJECTION, EMULSION INTRAVENOUS at 10:15

## 2019-12-16 NOTE — H&P
[facility]  Pre Procedure History & Physical    Chief Complaint:   Screening    Subjective     HPI:   Here for screening colonoscopy.  Family history of multiple second-degree relatives.  Father with polyps.    Past Medical History:   Past Medical History:   Diagnosis Date   • Deviated nasal septum    • Hyperlipidemia    • Hypertension    • Hypertrophy of inferior nasal turbinate    • Kidney stones    • Obstructive sleep apnea    • Sleep apnea    • Snoring        Past Surgical History:  Past Surgical History:   Procedure Laterality Date   • INCISION AND DRAINAGE LEG Bilateral 4/11/2017    Procedure: INCISION AND DRAINAGE BILATERAL  LOWER EXTREMITIES;  Surgeon: Keli Harrison MD;  Location: [facility];  Service:        Family History:  Family History   Problem Relation Age of Onset   • No Known Problems Father    • Diabetes Mother    • Colon polyps Paternal Grandmother    • Colon cancer Maternal Great-Grandmother    • Colon cancer Paternal Great-Grandmother        Social History:   reports that he has never smoked. He has never used smokeless tobacco. He reports that he does not drink alcohol or use drugs.    Medications:   Prior to Admission medications    Medication Sig Start Date End Date Taking? Authorizing Provider   atorvastatin (LIPITOR) 40 MG tablet Take 40 mg by mouth daily   Yes Bulmaro Boothe MD   cetirizine (zyrTEC) 10 MG tablet Take 10 mg by mouth daily   Yes Bulmaro Boothe MD   lisinopril (PRINIVIL,ZESTRIL) 40 MG tablet Take 40 mg by mouth daily   Yes Bulmaro Boothe MD   azelastine (ASTELIN) 0.1 % nasal spray into the nostril(s) as directed by provider. 11/9/15   Bulmaro Boothe MD   fluticasone (FLONASE) 50 MCG/ACT nasal spray INSTILL 2 PUFFS INTO EACH NOSTRIL ONCE DAILY 1/3/17   Bulmaro Boothe MD   therapeutic multivitamin-minerals (THERAGRAN-M) tablet Take 1 tablet by mouth daily    Bulmaro Boothe MD   doxycycline (VIBRAMYCIN) 100 MG  "capsule TK 1 C PO D WITH FOOD 10/10/18 12/16/19  Provider, MD Bulmaro   olopatadine (PATANASE) 0.6 % solution nasal solution 2 sprays by Each Nare route 2 (Two) Times a Day. 2/21/17 12/16/19  Anahi Mitchell APRN   SUPREP BOWEL PREP KIT 17.5-3.13-1.6 GM/177ML solution oral solution Take as directed by office instructions provided 5/9/19 12/16/19  Camelia Chan APRN       Allergies:  Measles, mumps & rubella vac; Measles, mumps & rubella vac; and Tetanus toxoids    Objective     Blood pressure 148/73, pulse 75, temperature 97.5 °F (36.4 °C), temperature source Temporal, resp. rate 20, height 175.3 cm (69\"), weight 125 kg (275 lb), SpO2 97 %.    Physical Exam   Constitutional: Pt is oriented to person, place, and in no distress.   HENT: Mouth/Throat: Oropharynx is clear.   Cardiovascular: Normal rate, regular rhythm.    Pulmonary/Chest: Effort normal. No respiratory distress. No  wheezes.   Abdominal: Soft. Non-distended.  Skin: Skin is warm and dry.   Psychiatric: Mood, memory, affect and judgment appear normal.     Assessment/Plan     Diagnosis:  Screening colonoscopy    Anticipated Surgical Procedure:    Proceed with colonoscopy as scheduled    The following major R/B/A were discussed with the patient, however the list is not all inclusive . Risk:  Bleeding (immediate and delayed), perforation (rupture or tear), reaction to medication, missed lesion/cancer, pain during the procedure, infection, need for surgery, need for ostomy, need for mechanical ventilation (breathing machine), death.  Benefits: removal of polyp/tissue, burn/clip/or inject to stop bleeding, removal of foreign body, dilate any stricture.  Alternatives: Xray or CT, surgery, do nothing with associated risk   The patient was given time to ask question and received explanation, and agrees to proceed as per History and Physical.   No guarantee given or expressed.    EMR Dragon/transcription disclaimer: Much of this encounter note is an " electronic transcription/translation of spoken language to printed text.  The electronic translation of spoken language may permit erroneous, or at times, nonsensical words or phrases to be inadvertently transcribed.  Although I have reviewed the note for such errors, some may still exist.    Sancho Fuller MD  10:11 AM  12/16/2019

## 2019-12-16 NOTE — ANESTHESIA POSTPROCEDURE EVALUATION
Patient: Jaguar Gallegos    Procedure Summary     Date:  12/16/19 Room / Location:  [facility] ENDOSCOPY 6 / [facility]    Anesthesia Start:  1010 Anesthesia Stop:  1032    Procedure:  COLONOSCOPY WITH ANESTHESIA (N/A ) Diagnosis:       Family hx of colon cancer      (Family hx of colon cancer [Z80.0])    Surgeon:  Sancho Fuller MD Provider:  Tashi Moffett CRNA    Anesthesia Type:  MAC ASA Status:  3          Anesthesia Type: MAC    Vitals  Vitals Value Taken Time   /52 12/16/2019 10:50 AM   Temp     Pulse 74 12/16/2019 10:51 AM   Resp 15 12/16/2019 10:50 AM   SpO2 97 % 12/16/2019 10:51 AM   Vitals shown include unvalidated device data.        Post Anesthesia Care and Evaluation    Patient location during evaluation: PHASE II  Patient participation: complete - patient participated  Level of consciousness: awake and sleepy but conscious  Pain score: 0  Pain management: adequate  Airway patency: patent  Anesthetic complications: No anesthetic complications    Cardiovascular status: acceptable  Respiratory status: acceptable  Hydration status: acceptable

## 2019-12-16 NOTE — ANESTHESIA PREPROCEDURE EVALUATION
Anesthesia Evaluation     Patient summary reviewed   no history of anesthetic complications:  NPO Solid Status: > 8 hours  NPO Liquid Status: > 2 hours           Airway   Mallampati: III  TM distance: >3 FB  Neck ROM: full  Dental - normal exam     Pulmonary - normal exam   (+) sleep apnea on CPAP,   (-) asthma, recent URI, not a smoker  Cardiovascular - normal exam  Exercise tolerance: good (4-7 METS)    Rhythm: regular  Rate: normal    (+) hypertension, hyperlipidemia,   (-) pacemaker, past MI, angina, cardiac stents, CABG      Neuro/Psych  (-) seizures, TIA, CVA  GI/Hepatic/Renal/Endo    (+) morbid obesity,  renal disease stones,   (-) GERD, liver disease, diabetes, no thyroid disorder    Musculoskeletal     Abdominal    Substance History      OB/GYN          Other                        Anesthesia Plan    ASA 3     MAC     intravenous induction     Anesthetic plan, all risks, benefits, and alternatives have been provided, discussed and informed consent has been obtained with: patient.

## 2019-12-18 LAB
LAB AP CASE REPORT: NORMAL
PATH REPORT.FINAL DX SPEC: NORMAL
PATH REPORT.GROSS SPEC: NORMAL

## 2020-01-07 ENCOUNTER — TELEPHONE (OUTPATIENT)
Dept: GASTROENTEROLOGY | Facility: CLINIC | Age: 45
End: 2020-01-07

## 2023-05-04 ENCOUNTER — OFFICE VISIT (OUTPATIENT)
Dept: GASTROENTEROLOGY | Facility: CLINIC | Age: 48
End: 2023-05-04
Payer: COMMERCIAL

## 2023-05-04 VITALS
BODY MASS INDEX: 42.51 KG/M2 | WEIGHT: 287 LBS | DIASTOLIC BLOOD PRESSURE: 72 MMHG | HEART RATE: 101 BPM | TEMPERATURE: 97.7 F | SYSTOLIC BLOOD PRESSURE: 132 MMHG | OXYGEN SATURATION: 98 % | HEIGHT: 69 IN

## 2023-05-04 DIAGNOSIS — I10 HTN (HYPERTENSION), BENIGN: ICD-10-CM

## 2023-05-04 DIAGNOSIS — Z80.0 FAMILY HX OF COLON CANCER: ICD-10-CM

## 2023-05-04 DIAGNOSIS — Z86.010 HX OF COLONIC POLYPS: Primary | ICD-10-CM

## 2023-05-04 RX ORDER — SODIUM, POTASSIUM,MAG SULFATES 17.5-3.13G
SOLUTION, RECONSTITUTED, ORAL ORAL
Qty: 177 ML | Refills: 0 | Status: SHIPPED | OUTPATIENT
Start: 2023-05-04

## 2023-05-04 NOTE — PROGRESS NOTES
Jaguar Gallegos  1975 5/4/2023  Chief Complaint   Patient presents with   • Colonoscopy     Subjective   HPI  Jaguar Gallegos is a 48 y.o. male who presents as a referral for preventative maintenance. He has no complaints of nausea or vomiting. No change in bowels. No wt loss. No BRBPR. No melena. There is positive family hx for colon cancer. No abdominal pain.    Past Medical History:   Diagnosis Date   • Deviated nasal septum    • Hyperlipidemia    • Hypertension    • Hypertrophy of inferior nasal turbinate    • Kidney stones    • Obstructive sleep apnea    • Sleep apnea    • Snoring      Past Surgical History:   Procedure Laterality Date   • COLONOSCOPY N/A 12/16/2019    2 Hyperplastic polyps at 20 cm one greater than 50 mm in the rectum resection not attempted refer to Dr. Lomas   • FLEXIBLE SIGMOIDOSCOPY  07/09/2021    Post-polypectomy scar in the rectum repeat exam in 1-2 years  Dr. Lomas   • INCISION AND DRAINAGE LEG Bilateral 04/11/2017    Procedure: INCISION AND DRAINAGE BILATERAL  LOWER EXTREMITIES;  Surgeon: Keli Harrison MD;  Location: [facility] OR;  Service:      Outpatient Medications Marked as Taking for the 5/4/23 encounter (Office Visit) with Camelia Chan APRN   Medication Sig Dispense Refill   • atorvastatin (LIPITOR) 40 MG tablet Take 40 mg by mouth daily     • azelastine (ASTELIN) 0.1 % nasal spray into the nostril(s) as directed by provider.     • cetirizine (zyrTEC) 10 MG tablet Take 10 mg by mouth daily     • fluticasone (FLONASE) 50 MCG/ACT nasal spray INSTILL 2 PUFFS INTO EACH NOSTRIL ONCE DAILY  1   • lisinopril (PRINIVIL,ZESTRIL) 40 MG tablet Take 40 mg by mouth daily     • therapeutic multivitamin-minerals (THERAGRAN-M) tablet Take 1 tablet by mouth daily       Allergies   Allergen Reactions   • Measles, Mumps & Rubella Vac Unknown (See Comments)     Turned red at age 3    • Measles, Mumps & Rubella Vac Other (See Comments)     Turned red at age 3    • Tetanus  "Toxoids Unknown (See Comments)     INCLUDES DTP ALLERGY  INCLUDES DTP ALLERGYTurned red at age 3   INCLUDES DTP ALLERGY     Social History     Socioeconomic History   • Marital status:    Tobacco Use   • Smoking status: Never   • Smokeless tobacco: Never   Substance and Sexual Activity   • Alcohol use: No   • Drug use: No   • Sexual activity: Yes     Partners: Female     Birth control/protection: Birth control pill     Family History   Problem Relation Age of Onset   • No Known Problems Father    • Diabetes Mother    • Colon polyps Paternal Grandmother    • Colon cancer Maternal Great-Grandmother    • Colon cancer Paternal Great-Grandmother      Health Maintenance   Topic Date Due   • HEPATITIS C SCREENING  Never done   • ANNUAL PHYSICAL  Never done   • LIPID PANEL  Never done   • COVID-19 Vaccine (4 - Booster for Pfizer series) 03/05/2022   • COLORECTAL CANCER SCREENING  07/09/2023   • INFLUENZA VACCINE  08/01/2023   • Pneumococcal Vaccine 0-64  Aged Out       REVIEW OF SYSTEMS  General: well appearing, no fever chills or sweats, no unexplained wt loss  HEENT: no acute visual or hearing disturbances  Cardiovascular: No chest pain or palpitations  Pulmonary: No shortness of breath, coughing, wheezing or hemoptysis  : No burning, urgency, hematuria, or dysuria  Musculoskeletal: No joint pain or stiffness  Peripheral: no edema  Skin: No lesions or rashes  Neuro: No dizziness, headaches, stroke, syncope  Endocrine: No hot or cold intolerances  Hematological: No blood dyscrasias    Objective   Vitals:    05/04/23 1248   BP: 132/72   Pulse: 101   Temp: 97.7 °F (36.5 °C)   TempSrc: Temporal   SpO2: 98%   Weight: 130 kg (287 lb)   Height: 175.3 cm (69\")     Body mass index is 42.38 kg/m².  Class 3 Severe Obesity (BMI >=40). Obesity-related health conditions include the following: hypertension. Obesity is unchanged. BMI is is above average; BMI management plan is completed. We discussed portion control and " increasing exercise.      PHYSICAL EXAM  General: age appropriate well nourished well appearing, no acute distress  Head: normocephalic and atraumatic  Global assessment-supple  Neck-No JVD noted, no lymphadenopathy  Pulmonary-clear to auscultation bilaterally, normal respiratory effort  Cardiovascular-normal rate and rhythm, normal heart sounds, S1 and S2 noted  Abdomen-soft, non tender, non distended, normal bowel sounds all 4 quadrants, no hepatosplenomegaly noted  Extremities-No clubbing cyanosis or edema  Neuro-Non focal, converses appropriately, awake, alert, oriented    Assessment & Plan     Diagnoses and all orders for this visit:    1. Hx of colonic polyps (Primary)  -     Case Request; Standing  -     Follow Anesthesia Guidelines / Protocol; Future  -     Obtain Informed Consent; Future  -     Implement Anesthesia Orders Day of Procedure; Standing  -     Obtain Informed Consent; Standing  -     Verify bowel prep was successful; Standing  -     Case Request  -     sodium-potassium-magnesium sulfates (Suprep Bowel Prep Kit) 17.5-3.13-1.6 GM/177ML solution oral solution; Take as directed by office instructions provided  Dispense: 177 mL; Refill: 0    2. HTN (hypertension), benign  Comments:  cont BP medication the day of procedure    3. Family hx of colon cancer        COLONOSCOPY WITH ANESTHESIA (N/A)  Body mass index is 42.38 kg/m².    Patient instructions on prep prior to procedure provided to the patient.    All risks, benefits, alternatives, and indications of colonoscopy procedure have been discussed with the patient. Risks to include perforation of the colon requiring possible surgery or colostomy, risk of bleeding from biopsies or removal of colon tissue, possibility of missing a colon polyp or cancer, or adverse drug reaction.  Benefits to include the diagnosis and management of disease of the colon and rectum. Alternatives to include barium enema, radiographic evaluation, lab testing or no  intervention. Pt verbalizes understanding and agrees.     Camelia Chan, APRN  2023  13:09 CDT      IF YOU SMOKE OR USE TOBACCO PLEASE READ THE FOLLOWIN minutes reading provided    Why is smoking bad for me?  Smoking increases the risk of heart disease, lung disease, vascular disease, stroke, and cancer.     If you smoke, STOP!    If you would like more information on quitting smoking, please visit the [employer] website: [email or web address]   This link will provide additional resources including the QUIT line and the Beat the Pack support groups.     For more information:    Quit Now [place]  -QUIT-NOW  [email or web address]

## 2023-07-24 ENCOUNTER — TELEPHONE (OUTPATIENT)
Dept: GASTROENTEROLOGY | Facility: CLINIC | Age: 48
End: 2023-07-24
Payer: COMMERCIAL

## 2025-05-14 ENCOUNTER — OFFICE VISIT (OUTPATIENT)
Dept: GASTROENTEROLOGY | Facility: CLINIC | Age: 50
End: 2025-05-14
Payer: COMMERCIAL

## 2025-05-14 VITALS
WEIGHT: 290 LBS | TEMPERATURE: 98.7 F | HEIGHT: 69 IN | BODY MASS INDEX: 42.95 KG/M2 | SYSTOLIC BLOOD PRESSURE: 140 MMHG | HEART RATE: 84 BPM | OXYGEN SATURATION: 98 % | DIASTOLIC BLOOD PRESSURE: 82 MMHG

## 2025-05-14 DIAGNOSIS — Z86.0100 HX OF COLONIC POLYPS: ICD-10-CM

## 2025-05-14 DIAGNOSIS — Z80.0 FAMILY HX OF COLON CANCER: Primary | ICD-10-CM

## 2025-05-14 DIAGNOSIS — I10 HTN (HYPERTENSION), BENIGN: ICD-10-CM

## 2025-05-14 RX ORDER — SODIUM, POTASSIUM,MAG SULFATES 17.5-3.13G
SOLUTION, RECONSTITUTED, ORAL ORAL
Qty: 177 ML | Refills: 0 | Status: SHIPPED | OUTPATIENT
Start: 2025-05-14

## 2025-05-14 NOTE — PROGRESS NOTES
Jaguar Gallegos  1975 5/14/2025  Chief Complaint   Patient presents with    GI Problem     Colonoscopy recall, blood per rectum when he wipes      Subjective   HPI  Jaguar Gallegos is a 50 y.o. male who presents as a referral for preventative maintenance. He has no complaints of nausea or vomiting. No change in bowels. No wt loss. No BRBPR. No melena. There is positive family hx for colon cancer in his mother. He has hx of polyps. No abdominal pain.    Past Medical History:   Diagnosis Date    Deviated nasal septum     Hyperlipidemia     Hypertension     Hypertrophy of inferior nasal turbinate     Kidney stones     Obstructive sleep apnea     Sleep apnea     Snoring      Past Surgical History:   Procedure Laterality Date    COLONOSCOPY N/A 12/16/2019    2 Hyperplastic polyps at 20 cm one greater than 50 mm in the rectum resection not attempted refer to Dr. Lomas    COLONOSCOPY N/A 07/18/2023    Dr. Fuller - diverticulosis - post polypectomy scar in rectum    FLEXIBLE SIGMOIDOSCOPY  07/09/2021    Post-polypectomy scar in the rectum repeat exam in 1-2 years  Dr. Lomas    INCISION AND DRAINAGE LEG Bilateral 04/11/2017    Procedure: INCISION AND DRAINAGE BILATERAL  LOWER EXTREMITIES;  Surgeon: Keli Harrison MD;  Location: [facility] OR;  Service:      Outpatient Medications Marked as Taking for the 5/14/25 encounter (Office Visit) with Camelia Chan APRN   Medication Sig Dispense Refill    atorvastatin (LIPITOR) 40 MG tablet Take 40 mg by mouth daily      cetirizine (zyrTEC) 10 MG tablet Take 10 mg by mouth daily      fluticasone (FLONASE) 50 MCG/ACT nasal spray INSTILL 2 PUFFS INTO EACH NOSTRIL ONCE DAILY  1    lisinopril (PRINIVIL,ZESTRIL) 40 MG tablet Take 40 mg by mouth daily      therapeutic multivitamin-minerals (THERAGRAN-M) tablet Take 1 tablet by mouth daily       Allergies   Allergen Reactions    Measles, Mumps & Rubella Vac Unknown (See Comments)     Turned red at age 3     Measles,  "Mumps & Rubella Vac Other (See Comments)     Turned red at age 3     Tetanus Toxoids Unknown (See Comments)     INCLUDES DTP ALLERGY  INCLUDES DTP ALLERGYTurned red at age 3   INCLUDES DTP ALLERGY     Social History     Socioeconomic History    Marital status:    Tobacco Use    Smoking status: Never    Smokeless tobacco: Never   Substance and Sexual Activity    Alcohol use: No    Drug use: No    Sexual activity: Yes     Partners: Female     Birth control/protection: Birth control pill     Family History   Problem Relation Age of Onset    Colon cancer Mother     Diabetes Mother     No Known Problems Father     Colon polyps Paternal Grandmother     Colon cancer Paternal Great-Grandmother     Colon cancer Maternal Great-Grandmother      Health Maintenance   Topic Date Due    LIPID PANEL  Never done    HEPATITIS C SCREENING  Never done    ANNUAL PHYSICAL  Never done    COVID-19 Vaccine (4 - 2024-25 season) 09/01/2024    Pneumococcal Vaccine 50+ (1 of 1 - PCV) Never done    ZOSTER VACCINE (2 of 2) 04/21/2025    INFLUENZA VACCINE  07/01/2025    COLORECTAL CANCER SCREENING  07/18/2026       REVIEW OF SYSTEMS  General: well appearing, no fever chills or sweats, no unexplained wt loss  HEENT: no acute visual or hearing disturbances  Cardiovascular: No chest pain or palpitations  Pulmonary: No shortness of breath, coughing, wheezing or hemoptysis  : No burning, urgency, hematuria, or dysuria  Musculoskeletal: No joint pain or stiffness  Peripheral: no edema  Skin: No lesions or rashes  Neuro: No dizziness, headaches, stroke, syncope  Endocrine: No hot or cold intolerances  Hematological: No blood dyscrasias    Objective   Vitals:    05/14/25 0955   BP: 140/82   Pulse: 84   Temp: 98.7 °F (37.1 °C)   SpO2: 98%   Weight: 132 kg (290 lb)   Height: 175.3 cm (69.02\")     Body mass index is 42.81 kg/m².  Class 3 Severe Obesity (BMI >=40). Obesity-related health conditions include the following: hypertension. Obesity is " unchanged. BMI is is above average; BMI management plan is completed. We discussed portion control and increasing exercise.      PHYSICAL EXAM  General: age appropriate well nourished well appearing, no acute distress  Head: normocephalic and atraumatic  Global assessment-supple  Neck-No JVD noted, no lymphadenopathy  Pulmonary-clear to auscultation bilaterally, normal respiratory effort  Cardiovascular-normal rate and rhythm, normal heart sounds, S1 and S2 noted  Abdomen-soft, non tender, non distended, normal bowel sounds all 4 quadrants, no hepatosplenomegaly noted  Extremities-No clubbing cyanosis or edema  Neuro-Non focal, converses appropriately, awake, alert, oriented    Assessment & Plan     Diagnoses and all orders for this visit:    1. Family hx of colon cancer (Primary)  Comments:  Enma was diagnosed in December with stage 4 colon cancer  Orders:  -     Case Request; Standing  -     Case Request    2. Hx of colonic polyps    3. HTN (hypertension), benign  Comments:  cont BP medication the day of procedure    Other orders  -     Implement Anesthesia Orders Day of Procedure; Standing  -     Follow Anesthesia Guidelines / Protocol; Future  -     Verify bowel prep was successful; Standing  -     Obtain Informed Consent; Standing  -     sodium-potassium-magnesium sulfates (Suprep Bowel Prep Kit) 17.5-3.13-1.6 GM/177ML solution oral solution; Take as directed by office instructions provided  Dispense: 177 mL; Refill: 0        COLONOSCOPY WITH ANESTHESIA (N/A)  Body mass index is 42.81 kg/m².    Patient instructions on prep prior to procedure provided to the patient.    All risks, benefits, alternatives, and indications of colonoscopy procedure have been discussed with the patient. Risks to include perforation of the colon requiring possible surgery or colostomy, risk of bleeding from biopsies or removal of colon tissue, possibility of missing a colon polyp or cancer, or adverse drug reaction.  Benefits to  include the diagnosis and management of disease of the colon and rectum. Alternatives to include barium enema, radiographic evaluation, lab testing or no intervention. Pt verbalizes understanding and agrees.     Camelia Chan, ARNULFO  2025  10:15 CDT      IF YOU SMOKE OR USE TOBACCO PLEASE READ THE FOLLOWIN minutes reading provided    Why is smoking bad for me?  Smoking increases the risk of heart disease, lung disease, vascular disease, stroke, and cancer.     If you smoke, STOP!    If you would like more information on quitting smoking, please visit the [employer] website: [email or web address]   This link will provide additional resources including the QUIT line and the Beat the Pack support groups.     For more information:    [facility] Now [facility]  -QUIT-NOW  [email or web address]

## 2025-05-14 NOTE — H&P (VIEW-ONLY)
Jaguar Gallegos  1975 5/14/2025  Chief Complaint   Patient presents with    GI Problem     Colonoscopy recall, blood per rectum when he wipes      Subjective   HPI  Jaguar Gallegos is a 50 y.o. male who presents as a referral for preventative maintenance. He has no complaints of nausea or vomiting. No change in bowels. No wt loss. No BRBPR. No melena. There is positive family hx for colon cancer in his mother. He has hx of polyps. No abdominal pain.    Past Medical History:   Diagnosis Date    Deviated nasal septum     Hyperlipidemia     Hypertension     Hypertrophy of inferior nasal turbinate     Kidney stones     Obstructive sleep apnea     Sleep apnea     Snoring      Past Surgical History:   Procedure Laterality Date    COLONOSCOPY N/A 12/16/2019    2 Hyperplastic polyps at 20 cm one greater than 50 mm in the rectum resection not attempted refer to Dr. Lomas    COLONOSCOPY N/A 07/18/2023    Dr. Fuller - diverticulosis - post polypectomy scar in rectum    FLEXIBLE SIGMOIDOSCOPY  07/09/2021    Post-polypectomy scar in the rectum repeat exam in 1-2 years  Dr. Lomas    INCISION AND DRAINAGE LEG Bilateral 04/11/2017    Procedure: INCISION AND DRAINAGE BILATERAL  LOWER EXTREMITIES;  Surgeon: Keli Harrison MD;  Location: [facility] OR;  Service:      Outpatient Medications Marked as Taking for the 5/14/25 encounter (Office Visit) with Camelia Chan APRN   Medication Sig Dispense Refill    atorvastatin (LIPITOR) 40 MG tablet Take 40 mg by mouth daily      cetirizine (zyrTEC) 10 MG tablet Take 10 mg by mouth daily      fluticasone (FLONASE) 50 MCG/ACT nasal spray INSTILL 2 PUFFS INTO EACH NOSTRIL ONCE DAILY  1    lisinopril (PRINIVIL,ZESTRIL) 40 MG tablet Take 40 mg by mouth daily      therapeutic multivitamin-minerals (THERAGRAN-M) tablet Take 1 tablet by mouth daily       Allergies   Allergen Reactions    Measles, Mumps & Rubella Vac Unknown (See Comments)     Turned red at age 3     Measles,  "Mumps & Rubella Vac Other (See Comments)     Turned red at age 3     Tetanus Toxoids Unknown (See Comments)     INCLUDES DTP ALLERGY  INCLUDES DTP ALLERGYTurned red at age 3   INCLUDES DTP ALLERGY     Social History     Socioeconomic History    Marital status:    Tobacco Use    Smoking status: Never    Smokeless tobacco: Never   Substance and Sexual Activity    Alcohol use: No    Drug use: No    Sexual activity: Yes     Partners: Female     Birth control/protection: Birth control pill     Family History   Problem Relation Age of Onset    Colon cancer Mother     Diabetes Mother     No Known Problems Father     Colon polyps Paternal Grandmother     Colon cancer Paternal Great-Grandmother     Colon cancer Maternal Great-Grandmother      Health Maintenance   Topic Date Due    LIPID PANEL  Never done    HEPATITIS C SCREENING  Never done    ANNUAL PHYSICAL  Never done    COVID-19 Vaccine (4 - 2024-25 season) 09/01/2024    Pneumococcal Vaccine 50+ (1 of 1 - PCV) Never done    ZOSTER VACCINE (2 of 2) 04/21/2025    INFLUENZA VACCINE  07/01/2025    COLORECTAL CANCER SCREENING  07/18/2026       REVIEW OF SYSTEMS  General: well appearing, no fever chills or sweats, no unexplained wt loss  HEENT: no acute visual or hearing disturbances  Cardiovascular: No chest pain or palpitations  Pulmonary: No shortness of breath, coughing, wheezing or hemoptysis  : No burning, urgency, hematuria, or dysuria  Musculoskeletal: No joint pain or stiffness  Peripheral: no edema  Skin: No lesions or rashes  Neuro: No dizziness, headaches, stroke, syncope  Endocrine: No hot or cold intolerances  Hematological: No blood dyscrasias    Objective   Vitals:    05/14/25 0955   BP: 140/82   Pulse: 84   Temp: 98.7 °F (37.1 °C)   SpO2: 98%   Weight: 132 kg (290 lb)   Height: 175.3 cm (69.02\")     Body mass index is 42.81 kg/m².  Class 3 Severe Obesity (BMI >=40). Obesity-related health conditions include the following: hypertension. Obesity is " unchanged. BMI is is above average; BMI management plan is completed. We discussed portion control and increasing exercise.      PHYSICAL EXAM  General: age appropriate well nourished well appearing, no acute distress  Head: normocephalic and atraumatic  Global assessment-supple  Neck-No JVD noted, no lymphadenopathy  Pulmonary-clear to auscultation bilaterally, normal respiratory effort  Cardiovascular-normal rate and rhythm, normal heart sounds, S1 and S2 noted  Abdomen-soft, non tender, non distended, normal bowel sounds all 4 quadrants, no hepatosplenomegaly noted  Extremities-No clubbing cyanosis or edema  Neuro-Non focal, converses appropriately, awake, alert, oriented    Assessment & Plan     Diagnoses and all orders for this visit:    1. Family hx of colon cancer (Primary)  Comments:  Enma was diagnosed in December with stage 4 colon cancer  Orders:  -     Case Request; Standing  -     Case Request    2. Hx of colonic polyps    3. HTN (hypertension), benign  Comments:  cont BP medication the day of procedure    Other orders  -     Implement Anesthesia Orders Day of Procedure; Standing  -     Follow Anesthesia Guidelines / Protocol; Future  -     Verify bowel prep was successful; Standing  -     Obtain Informed Consent; Standing  -     sodium-potassium-magnesium sulfates (Suprep Bowel Prep Kit) 17.5-3.13-1.6 GM/177ML solution oral solution; Take as directed by office instructions provided  Dispense: 177 mL; Refill: 0        COLONOSCOPY WITH ANESTHESIA (N/A)  Body mass index is 42.81 kg/m².    Patient instructions on prep prior to procedure provided to the patient.    All risks, benefits, alternatives, and indications of colonoscopy procedure have been discussed with the patient. Risks to include perforation of the colon requiring possible surgery or colostomy, risk of bleeding from biopsies or removal of colon tissue, possibility of missing a colon polyp or cancer, or adverse drug reaction.  Benefits to  include the diagnosis and management of disease of the colon and rectum. Alternatives to include barium enema, radiographic evaluation, lab testing or no intervention. Pt verbalizes understanding and agrees.     Camelia Chan, ARNULFO  2025  10:15 CDT      IF YOU SMOKE OR USE TOBACCO PLEASE READ THE FOLLOWIN minutes reading provided    Why is smoking bad for me?  Smoking increases the risk of heart disease, lung disease, vascular disease, stroke, and cancer.     If you smoke, STOP!    If you would like more information on quitting smoking, please visit the tab ticketbroker website: [email or web address] ([email or web address]   This link will provide additional resources including the QUIT line and the Beat the Pack support groups.     For more information:    Q[facility] Now [facility]  -QUIT-NOW  [email or web address]

## 2025-05-27 ENCOUNTER — TELEPHONE (OUTPATIENT)
Dept: GASTROENTEROLOGY | Facility: CLINIC | Age: 50
End: 2025-05-27
Payer: COMMERCIAL

## 2025-05-27 DIAGNOSIS — Z86.0100 HX OF COLONIC POLYPS: ICD-10-CM

## 2025-05-27 DIAGNOSIS — Z80.0 FAMILY HX OF COLON CANCER: Primary | ICD-10-CM

## 2025-05-27 RX ORDER — SODIUM, POTASSIUM,MAG SULFATES 17.5-3.13G
SOLUTION, RECONSTITUTED, ORAL ORAL
Qty: 177 ML | Refills: 0 | Status: SHIPPED | OUTPATIENT
Start: 2025-05-27

## 2025-05-27 NOTE — TELEPHONE ENCOUNTER
Caller: Gallegos Jaguar Geronimo    Relationship: Self    Best call back number: [phone]    Requested Prescriptions:  PREP MEDICATION  Requested Prescriptions      No prescriptions requested or ordered in this encounter        Pharmacy where request should be sent:    [id number] - [place] - [address] AT [facility] [address] - [phone] [facility] [phone] [address] [place] [address]      Last office visit with prescribing clinician: Visit date not found   Last telemedicine visit with prescribing clinician: Visit date not found   Next office visit with prescribing clinician: Visit date not found     Additional details provided by patient: PT WENT TO  PREP AT HIS PHARMACY BUT WAS ADVISED THEY DO NOT HAVE THE PREP MEDICATION AND TO CALL HIS PROVIDERS OFFICE AND HAVE THEM SEND THE SCRIPT TO ANOTHER PHARMACY. PT ASKED IF MEDICATION CAN BE SENT TO   [id number] - [place] - [address] AT [facility], [facility] [address] - [phone] [facility] [phone] [address] [place] [address]  PT HAS A PROCEDURE SCHEDULED FOR 6/4    Does the patient have less than a 3 day supply:  [x] Yes  [] No    Would you like a call back once the refill request has been completed: [x] Yes [] No    If the office needs to give you a call back, can they leave a voicemail: [x] Yes [] No    Ngozi Page Rep   05/27/25 13:40 CDT

## 2025-06-04 ENCOUNTER — ANESTHESIA (OUTPATIENT)
Dept: GASTROENTEROLOGY | Facility: HOSPITAL | Age: 50
End: 2025-06-04
Payer: COMMERCIAL

## 2025-06-04 ENCOUNTER — HOSPITAL ENCOUNTER (OUTPATIENT)
Facility: HOSPITAL | Age: 50
Setting detail: HOSPITAL OUTPATIENT SURGERY
Discharge: HOME | End: 2025-06-04
Attending: INTERNAL MEDICINE | Admitting: INTERNAL MEDICINE
Payer: COMMERCIAL

## 2025-06-04 ENCOUNTER — ANESTHESIA EVENT (OUTPATIENT)
Dept: GASTROENTEROLOGY | Facility: HOSPITAL | Age: 50
End: 2025-06-04
Payer: COMMERCIAL

## 2025-06-04 VITALS
WEIGHT: 185 LBS | TEMPERATURE: 97.3 F | BODY MASS INDEX: 27.4 KG/M2 | HEART RATE: 69 BPM | OXYGEN SATURATION: 97 % | HEIGHT: 69 IN | DIASTOLIC BLOOD PRESSURE: 75 MMHG | RESPIRATION RATE: 20 BRPM | SYSTOLIC BLOOD PRESSURE: 118 MMHG

## 2025-06-04 DIAGNOSIS — Z80.0 FAMILY HX OF COLON CANCER: ICD-10-CM

## 2025-06-04 PROCEDURE — 88305 TISSUE EXAM BY PATHOLOGIST: CPT | Performed by: INTERNAL MEDICINE

## 2025-06-04 PROCEDURE — 25810000003 SODIUM CHLORIDE 0.9 % SOLUTION: Performed by: NURSE ANESTHETIST, CERTIFIED REGISTERED

## 2025-06-04 PROCEDURE — 25810000003 SODIUM CHLORIDE 0.9 % SOLUTION: Performed by: ANESTHESIOLOGY

## 2025-06-04 PROCEDURE — 25010000002 PROPOFOL 10 MG/ML EMULSION: Performed by: NURSE ANESTHETIST, CERTIFIED REGISTERED

## 2025-06-04 PROCEDURE — 45385 COLONOSCOPY W/LESION REMOVAL: CPT | Performed by: INTERNAL MEDICINE

## 2025-06-04 RX ORDER — SODIUM CHLORIDE 0.9 % (FLUSH) 0.9 %
10 SYRINGE (ML) INJECTION EVERY 12 HOURS SCHEDULED
Status: CANCELLED | OUTPATIENT
Start: 2025-06-04

## 2025-06-04 RX ORDER — SODIUM CHLORIDE 9 MG/ML
100 INJECTION, SOLUTION INTRAVENOUS CONTINUOUS
Status: CANCELLED | OUTPATIENT
Start: 2025-06-04 | End: 2025-06-10

## 2025-06-04 RX ORDER — SODIUM CHLORIDE 9 MG/ML
INJECTION, SOLUTION INTRAVENOUS CONTINUOUS PRN
Status: DISCONTINUED | OUTPATIENT
Start: 2025-06-04 | End: 2025-06-04 | Stop reason: SURG

## 2025-06-04 RX ORDER — SODIUM CHLORIDE 0.9 % (FLUSH) 0.9 %
10 SYRINGE (ML) INJECTION AS NEEDED
Status: DISCONTINUED | OUTPATIENT
Start: 2025-06-04 | End: 2025-06-04 | Stop reason: HOSPADM

## 2025-06-04 RX ORDER — SODIUM CHLORIDE 0.9 % (FLUSH) 0.9 %
10 SYRINGE (ML) INJECTION AS NEEDED
Status: CANCELLED | OUTPATIENT
Start: 2025-06-04

## 2025-06-04 RX ORDER — PROPOFOL 10 MG/ML
VIAL (ML) INTRAVENOUS AS NEEDED
Status: DISCONTINUED | OUTPATIENT
Start: 2025-06-04 | End: 2025-06-04 | Stop reason: SURG

## 2025-06-04 RX ORDER — SODIUM CHLORIDE 9 MG/ML
500 INJECTION, SOLUTION INTRAVENOUS ONCE
Status: COMPLETED | OUTPATIENT
Start: 2025-06-04 | End: 2025-06-04

## 2025-06-04 RX ORDER — SODIUM CHLORIDE 9 MG/ML
40 INJECTION, SOLUTION INTRAVENOUS AS NEEDED
Status: CANCELLED | OUTPATIENT
Start: 2025-06-04

## 2025-06-04 RX ADMIN — SODIUM CHLORIDE: 9 INJECTION, SOLUTION INTRAVENOUS at 09:50

## 2025-06-04 RX ADMIN — PROPOFOL 100 MG: 10 INJECTION, EMULSION INTRAVENOUS at 09:51

## 2025-06-04 RX ADMIN — SODIUM CHLORIDE 500 ML: 9 INJECTION, SOLUTION INTRAVENOUS at 09:09

## 2025-06-04 RX ADMIN — PROPOFOL 50 MG: 10 INJECTION, EMULSION INTRAVENOUS at 10:00

## 2025-06-04 NOTE — ANESTHESIA PREPROCEDURE EVALUATION
Anesthesia Evaluation     Patient summary reviewed and Nursing notes reviewed   no history of anesthetic complications:   NPO Solid Status: > 8 hours  NPO Liquid Status: > 2 hours           Airway   Mallampati: II  TM distance: >3 FB  Neck ROM: full  No difficulty expected  Dental      Pulmonary    (+) ,sleep apnea on CPAP  (-) not a smoker  Cardiovascular   Exercise tolerance: good (4-7 METS)    (+) hypertension, hyperlipidemia  (-) CAD      Neuro/Psych  (-) seizures, TIA, CVA  GI/Hepatic/Renal/Endo    (+) renal disease- stones  (-) liver disease, diabetes    Musculoskeletal     Abdominal    Substance History      OB/GYN          Other                    Anesthesia Plan    ASA 2     MAC     intravenous induction     Anesthetic plan, risks, benefits, and alternatives have been provided, discussed and informed consent has been obtained with: patient.    CODE STATUS:

## 2025-06-04 NOTE — ANESTHESIA POSTPROCEDURE EVALUATION
"Patient: Jaguar Gallegos    Procedure Summary       Date: 06/04/25 Room / Location: [facility] ENDOSCOPY 2 / [facility] ENDOSCOPY    Anesthesia Start: 0950 Anesthesia Stop: 1007    Procedure: COLONOSCOPY WITH ANESTHESIA Diagnosis:       Family hx of colon cancer      (Family hx of colon cancer [Z80.0])    Surgeons: Sancho Fuller MD Provider: Evert Brennan CRNA    Anesthesia Type: MAC ASA Status: 2            Anesthesia Type: MAC    Vitals  Vitals Value Taken Time   BP     Temp     Pulse 71 06/04/25 10:07   Resp     SpO2 94 % 06/04/25 10:07   Vitals shown include unfiled device data.        Post Anesthesia Care and Evaluation    Patient location during evaluation: PHASE II  Patient participation: complete - patient participated  Level of consciousness: awake and alert  Pain management: adequate    Airway patency: patent  Anesthetic complications: No anesthetic complications    Cardiovascular status: acceptable  Respiratory status: acceptable  Hydration status: acceptable    Comments: Blood pressure 134/73, pulse 75, temperature 97.3 °F (36.3 °C), temperature source Temporal, resp. rate 17, height 175.3 cm (69\"), weight 83.9 kg (185 lb), SpO2 99%.    Pt discharged from PACU based on gerhard score >8    "

## 2025-06-05 LAB
CYTO UR: NORMAL
LAB AP CASE REPORT: NORMAL
Lab: NORMAL
PATH REPORT.FINAL DX SPEC: NORMAL
PATH REPORT.GROSS SPEC: NORMAL

## 2025-06-09 ENCOUNTER — RESULTS FOLLOW-UP (OUTPATIENT)
Dept: GASTROENTEROLOGY | Facility: HOSPITAL | Age: 50
End: 2025-06-09
Payer: COMMERCIAL

## 2025-06-09 NOTE — LETTER
Jaguar Gallegos  [address] [place] [address]        6/9/2025    Jaguar Gallegos,    I have reviewed the pathology from your recent colonoscopy, and the report confirmed these polyps to be adenomas. The polyps that were removed DID NOT contain any cancer, however this is the type of tissue that can develop into a cancer over time if not removed. Given these findings, I suggest you have a repeat colonoscopy in 1 year as we discussed at the time of your last procedure. Please domingo your calendar and call our office 4-6 weeks prior to that date to schedule your office appointment.     My office will send a reminder, however it is your responsibility to arrange this appointment and to notify our office of any address and telephone number changes to help us better assist in your medical care.       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  These can be a little more aggressive.  There is nothing that suggest any type of cancerous change and it was a small polyp but given your history I am going to suggest we change you to a 1 year follow-up colonoscopy.  If everything looks okay after that then we can go back to 3 years

## (undated) DEVICE — SENSR O2 OXIMAX FNGR A/ 18IN NONSTR

## (undated) DEVICE — Device: Brand: SINGLE USE ELECTROSURGICAL SNARE SD-400

## (undated) DEVICE — GLV SURG BIOGEL M LTX PF 7 1/2

## (undated) DEVICE — CUFF,BP,DISP,1 TUBE,ADULT,HP: Brand: MEDLINE

## (undated) DEVICE — Device: Brand: DEFENDO AIR/WATER/SUCTION AND BIOPSY VALVE

## (undated) DEVICE — YANKAUER,BULB TIP WITH VENT: Brand: ARGYLE

## (undated) DEVICE — SINGLE-USE POLYPECTOMY SNARE: Brand: CAPTIVATOR II

## (undated) DEVICE — DEFENDO AIR WATER SUCTION AND BIOPSY VALVE KIT FOR  OLYMPUS: Brand: DEFENDO AIR/WATER/SUCTION AND BIOPSY VALVE

## (undated) DEVICE — APPL CHLORAPREP W/TINT 26ML ORNG

## (undated) DEVICE — SWAB CULT AERO TWIN MD

## (undated) DEVICE — MASK,OXYGEN,MED CONC,ADLT,7' TUB, UC: Brand: PENDING

## (undated) DEVICE — TBG SMPL FLTR LINE NASL 02/C02 A/ BX/100

## (undated) DEVICE — CULT ANAERB

## (undated) DEVICE — PAD MINOR UNIVERSAL: Brand: MEDLINE INDUSTRIES, INC.

## (undated) DEVICE — THE CHANNEL CLEANING BRUSH IS A NYLON FLEXI BRUSH ATTACHED TO A FLEXIBLE PLASTIC SHEATH DESIGNED TO SAFELY REMOVE DEBRIS FROM FLEXIBLE ENDOSCOPES.

## (undated) DEVICE — THE SINGLE USE ETRAP – POLYP TRAP IS USED FOR SUCTION RETRIEVAL OF ENDOSCOPICALLY REMOVED POLYPS.: Brand: ETRAP

## (undated) DEVICE — NDL HYPO PRECISIONGLIDE REG 25G 1 1/2

## (undated) DEVICE — PK TURNOVER RM ADV

## (undated) DEVICE — ENDOGATOR AUXILIARY WATER JET CONNECTOR: Brand: ENDOGATOR

## (undated) DEVICE — ENDOCUFF VISION PRP SM 10.4

## (undated) DEVICE — ARGYLE YANKAUER BULB TIP WITH VENT: Brand: ARGYLE

## (undated) DEVICE — PAD,PREPPING,CUFFED,24X48,7",NONSTERILE: Brand: MEDLINE

## (undated) DEVICE — FRCP BIOP COLD ENDOJAW ALLGTR W/NDL 2.8X2300MM BLU